# Patient Record
Sex: MALE | Race: WHITE | Employment: FULL TIME | URBAN - METROPOLITAN AREA
[De-identification: names, ages, dates, MRNs, and addresses within clinical notes are randomized per-mention and may not be internally consistent; named-entity substitution may affect disease eponyms.]

---

## 2017-01-19 ENCOUNTER — PRE VISIT (OUTPATIENT)
Dept: CARDIOLOGY | Facility: CLINIC | Age: 62
End: 2017-01-19

## 2017-02-03 ENCOUNTER — OFFICE VISIT (OUTPATIENT)
Dept: CARDIOLOGY | Facility: CLINIC | Age: 62
End: 2017-02-03
Attending: INTERNAL MEDICINE
Payer: COMMERCIAL

## 2017-02-03 VITALS
HEIGHT: 73 IN | BODY MASS INDEX: 28.36 KG/M2 | SYSTOLIC BLOOD PRESSURE: 118 MMHG | DIASTOLIC BLOOD PRESSURE: 72 MMHG | HEART RATE: 60 BPM | WEIGHT: 214 LBS

## 2017-02-03 DIAGNOSIS — I25.10 CORONARY ARTERY DISEASE INVOLVING NATIVE CORONARY ARTERY OF NATIVE HEART WITHOUT ANGINA PECTORIS: ICD-10-CM

## 2017-02-03 DIAGNOSIS — I10 ESSENTIAL HYPERTENSION, BENIGN: Primary | ICD-10-CM

## 2017-02-03 DIAGNOSIS — E78.00 HYPERCHOLESTEROLEMIA: ICD-10-CM

## 2017-02-03 DIAGNOSIS — I25.2 OLD MYOCARDIAL INFARCTION: ICD-10-CM

## 2017-02-03 LAB
ANION GAP SERPL CALCULATED.3IONS-SCNC: 10.5 MMOL/L (ref 6–17)
BUN SERPL-MCNC: 13 MG/DL (ref 7–30)
CALCIUM SERPL-MCNC: 9.1 MG/DL (ref 8.5–10.5)
CHLORIDE SERPL-SCNC: 104 MMOL/L (ref 98–107)
CHOLEST SERPL-MCNC: 108 MG/DL
CO2 SERPL-SCNC: 31 MMOL/L (ref 23–29)
CREAT SERPL-MCNC: 0.96 MG/DL (ref 0.7–1.3)
GFR SERPL CREATININE-BSD FRML MDRD: 80 ML/MIN/1.7M2
GLUCOSE SERPL-MCNC: 103 MG/DL (ref 70–105)
HDLC SERPL-MCNC: 36 MG/DL
LDLC SERPL CALC-MCNC: 59 MG/DL
NONHDLC SERPL-MCNC: 72 MG/DL
POTASSIUM SERPL-SCNC: 4.5 MMOL/L (ref 3.5–5.1)
SODIUM SERPL-SCNC: 141 MMOL/L (ref 136–145)
TRIGL SERPL-MCNC: 63 MG/DL

## 2017-02-03 PROCEDURE — 80048 BASIC METABOLIC PNL TOTAL CA: CPT | Performed by: INTERNAL MEDICINE

## 2017-02-03 PROCEDURE — 80061 LIPID PANEL: CPT | Performed by: INTERNAL MEDICINE

## 2017-02-03 PROCEDURE — 36415 COLL VENOUS BLD VENIPUNCTURE: CPT | Performed by: INTERNAL MEDICINE

## 2017-02-03 PROCEDURE — 99214 OFFICE O/P EST MOD 30 MIN: CPT | Performed by: INTERNAL MEDICINE

## 2017-02-03 NOTE — PROGRESS NOTES
HISTORY OF PRESENT ILLNESS:  Adis is a very nice 61-year-old gentleman who is brother-in-law to one of my best friends from medical school.  I also see Adis's sister in clinic as they both have a very strong family history of coronary artery disease and very high calcium scores.      Adis's cardiac history dates back to 2002 when he had an inferior wall myocardial infarction with bare metal stents being placed in all 3 coronary arteries at [facility].  In 02/2010, a second inferior wall myocardial infarction led to a drug-eluting stent placed in his distal right coronary artery.  The infarct was tiny.  Adis and his sister both have refractory hypercholesterolemia.  He struggled with cigarettes for quite some time, but has quit completely.  In the past, he has had problems with some erectile dysfunction and was having infertility problems.      Adis returns to clinic stating things are going quite well.  He has no exertional chest, arm, neck, jaw or shoulder discomfort.  No lightheadedness, dizziness, syncope or near-syncope.  He notes no side effects or problems with his current medical regimen.  He states he is not exercising as much as he should be, but has managed to lose about 9 pounds of weight over the last year, stating he is just watching calories and trying to eat healthier.  He states he remains off of cigarettes completely.      ASSESSMENT AND PLAN:  Adis appears to be doing quite well from a cardiac standpoint without clinical evidence of ischemia, heart failure or significant arrhythmia.      Blood pressure is very well controlled at 118/72, in the SPRINT range.      Fasting lipid profile is also outstanding.  Total cholesterol is 108.  HDL is 36, LDL is 59, triglycerides are 63.  In most aspects, it is the best cholesterol profile he has ever had.  HDL has dropped down some, but I have pointed out that LDL and total cholesterol and triglycerides are all significantly down.  Overall, if  we looked at a risk ratio of cholesterol to HDL he is still doing quite well.  He is on rosuvastatin 40 and Zetia 10 and we will continue both of these medications.      Reviewing the rest of his medications, he will stay on aspirin, losartan and Bystolic.  On his own, he takes fish oil capsules and multivitamin.      I have encouraged him to make sure he continues to exercise regularly, aiming for at least 5 times a week, also to keep adding more fresh fruits and vegetables, high fiber, whole grain foods, cut back on the red meat and the processed foods.  I have congratulated him on his 9-pound weight loss.  I will plan on seeing him back in 1 year.  If he should have any problems, I would be glad to see him sooner.      Thank you for allowing me to participate in his care.         SANDEEP PUENTES MD, [facility]             D: 2017 09:36   T: 2017 13:28   MT: ARABELLA      Name:     KEEGAN MYRICK   MRN:      [id number]        Account:      [id number]   :      1955           Service Date: 2017      Document: [id number]

## 2017-02-03 NOTE — PROGRESS NOTES
HPI and Plan:   See dictation    Orders Placed This Encounter   Procedures     Basic metabolic panel     Lipid Profile     Follow-Up with Cardiologist       No orders of the defined types were placed in this encounter.       There are no discontinued medications.      Encounter Diagnoses   Name Primary?     Coronary artery disease involving native coronary artery of native heart without angina pectoris      Essential hypertension, benign Yes     Hypercholesterolemia      Old myocardial infarction        CURRENT MEDICATIONS:  Current Outpatient Prescriptions   Medication Sig Dispense Refill     nitroglycerin (NITROSTAT) 0.4 MG SL tablet Place 1 tablet (0.4 mg) under the tongue every 5 minutes as needed for chest pain 25 tablet 3     losartan (COZAAR) 25 MG tablet Take 1 tablet (25 mg) by mouth daily 90 tablet 1     nebivolol (BYSTOLIC) 5 MG tablet Take 1 tablet (5 mg) by mouth daily 90 tablet 1     rosuvastatin (CRESTOR) 40 MG tablet Take 1 tablet (40 mg) by mouth daily 90 tablet 1     tadalafil (CIALIS) 10 MG tablet Take 1 tablet (10 mg) by mouth daily as needed for erectile dysfunction 14 tablet 6     ezetimibe (ZETIA) 10 MG tablet Take 1 tablet (10 mg) by mouth daily 30 tablet 11     aspirin 81 MG tablet Take by mouth daily       Multiple Vitamins-Minerals (MULTIVITAMIN OR) Take by mouth daily       Omega-3 Fatty Acids (FISH OIL) 500 MG CAPS Take 1,500 mg by mouth daily         ALLERGIES   No Known Allergies    PAST MEDICAL HISTORY:  Past Medical History   Diagnosis Date     CAD (coronary artery disease)      cardiac cath 2010: ALEJANDRA to RCA; cardiac cath 2002: BMS to RCA, mid LAD, mid circumflex     Old myocardial infarction      inferior MI 2010, inferior MI 2002     Hypercholesterolemia      Benign essential hypertension        PAST SURGICAL HISTORY:  Past Surgical History   Procedure Laterality Date     Heart cath coronary angiogram w/lv gram  1/2002     @ [facility] Bare Metal stents X 4,  distal RCA, mid :AD and  "mid Circumflex     Heart cath coronary angiogram w/lv gram  2/2010     2/10 Mid RCA thrombotic 90 % lesion stented       FAMILY HISTORY:  Family History   Problem Relation Age of Onset     HEART DISEASE Father      MI at 58       SOCIAL HISTORY:  Social History     Social History     Marital Status:      Spouse Name: N/A     Number of Children: N/A     Years of Education: N/A     Social History Main Topics     Smoking status: Former Smoker     Types: Cigars     Smokeless tobacco: None      Comment: smokes very occ. cigar     Alcohol Use: 0.0 oz/week     0 Standard drinks or equivalent per week      Comment: 2-3 times per week     Drug Use: No     Sexual Activity: Not Asked     Other Topics Concern     Caffeine Concern No     2-3 cups per day     Sleep Concern No     Stress Concern No     Special Diet Yes     low fat, high fiber     Back Care No     Exercise No     Parent/Sibling W/ Cabg, Mi Or Angioplasty Before 65f 55m? No     Social History Narrative       Review of Systems:  Skin:  Negative       Eyes:  Negative      ENT:  Negative      Respiratory:  Negative       Cardiovascular:  Negative      Gastroenterology: Positive for heartburn    Genitourinary:  Negative      Musculoskeletal:  Negative      Neurologic:  Negative      Psychiatric:  Negative      Heme/Lymph/Imm:  Negative      Endocrine:  Negative        Physical Exam:  Vitals: /72 mmHg  Pulse 60  Ht 1.854 m (6' 1\")  Wt 97.07 kg (214 lb)  BMI 28.24 kg/m2    Constitutional:  cooperative, alert and oriented, well developed, well nourished, in no acute distress overweight      Skin:  warm and dry to the touch, no apparent skin lesions or masses noted        Head:  normocephalic, no masses or lesions        Eyes:  pupils equal and round;conjunctivae and lids unremarkable;sclera white;no xanthalasma;no nystagmus        ENT:           Neck:  carotid pulses are full and equal bilaterally;no carotid bruit        Chest:  normal breath sounds, clear " to auscultation, normal A-P diameter, normal symmetry, normal respiratory excursion, no use of accessory muscles          Cardiac: regular rhythm;no murmurs, gallops or rubs detected;normal S1 and S2                  Abdomen:           Vascular: pulses full and equal                                        Extremities and Back:  no edema;no spinal abnormalities noted;normal muscle strength and tone              Neurological:  affect appropriate, oriented to time, person and place;no gross motor deficits              CC  Christopher Martin MD  [facility]  [address] [place] [id number]

## 2017-02-03 NOTE — Clinical Note
2/3/2017    Mary SEPULVEDA [facility]  [address] [place] [address]    RE: Adis Prince     Dear Colleague,    I had the pleasure of seeing Adis Prince in the [facility].    Adis is a very nice 61-year-old gentleman who is brother-in-law to one of my best friends from medical school.  I also see Adis's sister in clinic as they both have a very strong family history of coronary artery disease and very high calcium scores.      Adis's cardiac history dates back to 2002 when he had an inferior wall myocardial infarction with bare metal stents being placed in all 3 coronary arteries at [facility].  In 02/2010, a second inferior wall myocardial infarction led to a drug-eluting stent placed in his distal right coronary artery.  The infarct was tiny.  Adis and his sister both have refractory hypercholesterolemia.  He struggled with cigarettes for quite some time, but has quit completely.  In the past, he has had problems with some erectile dysfunction and was having infertility problems.      Adis returns to clinic stating things are going quite well.  He has no exertional chest, arm, neck, jaw or shoulder discomfort.  No lightheadedness, dizziness, syncope or near-syncope.  He notes no side effects or problems with his current medical regimen.  He states he is not exercising as much as he should be, but has managed to lose about 9 pounds of weight over the last year, stating he is just watching calories and trying to eat healthier.  He states he remains off of cigarettes completely.      Outpatient Encounter Prescriptions as of 2/3/2017   Medication Sig Dispense Refill     nitroglycerin (NITROSTAT) 0.4 MG SL tablet Place 1 tablet (0.4 mg) under the tongue every 5 minutes as needed for chest pain 25 tablet 3     losartan (COZAAR) 25 MG tablet Take 1 tablet (25 mg) by mouth daily 90 tablet 1     nebivolol (BYSTOLIC) 5 MG tablet Take 1 tablet (5  mg) by mouth daily 90 tablet 1     rosuvastatin (CRESTOR) 40 MG tablet Take 1 tablet (40 mg) by mouth daily 90 tablet 1     tadalafil (CIALIS) 10 MG tablet Take 1 tablet (10 mg) by mouth daily as needed for erectile dysfunction 14 tablet 6     ezetimibe (ZETIA) 10 MG tablet Take 1 tablet (10 mg) by mouth daily 30 tablet 11     aspirin 81 MG tablet Take by mouth daily       Multiple Vitamins-Minerals (MULTIVITAMIN OR) Take by mouth daily       Omega-3 Fatty Acids (FISH OIL) 500 MG CAPS Take 1,500 mg by mouth daily       No facility-administered encounter medications on file as of 2/3/2017.     ASSESSMENT AND PLAN:  Adis appears to be doing quite well from a cardiac standpoint without clinical evidence of ischemia, heart failure or significant arrhythmia.      Blood pressure is very well controlled at 118/72, in the SPRINT range.      Fasting lipid profile is also outstanding.  Total cholesterol is 108.  HDL is 36, LDL is 59, triglycerides are 63.  In most aspects, it is the best cholesterol profile he has ever had.  HDL has dropped down some, but I have pointed out that LDL and total cholesterol and triglycerides are all significantly down.  Overall, if we looked at a risk ratio of cholesterol to HDL he is still doing quite well.  He is on rosuvastatin 40 and Zetia 10 and we will continue both of these medications.      Reviewing the rest of his medications, he will stay on aspirin, losartan and Bystolic.  On his own, he takes fish oil capsules and multivitamin.      I have encouraged him to make sure he continues to exercise regularly, aiming for at least 5 times a week, also to keep adding more fresh fruits and vegetables, high fiber, whole grain foods, cut back on the red meat and the processed foods.  I have congratulated him on his 9-pound weight loss.  I will plan on seeing him back in 1 year.  If he should have any problems, I would be glad to see him sooner.      Thank you for allowing me to participate in  his care.      Sincerely,    Christopher Martin MD     [facility]

## 2017-02-16 DIAGNOSIS — I25.10 CORONARY ARTERY DISEASE INVOLVING NATIVE CORONARY ARTERY OF NATIVE HEART WITHOUT ANGINA PECTORIS: ICD-10-CM

## 2017-02-16 RX ORDER — EZETIMIBE 10 MG/1
10 TABLET ORAL DAILY
Qty: 90 TABLET | Refills: 3 | Status: SHIPPED | OUTPATIENT
Start: 2017-02-16 | End: 2018-03-20

## 2017-03-27 DIAGNOSIS — I10 ESSENTIAL HYPERTENSION, BENIGN: ICD-10-CM

## 2017-03-27 DIAGNOSIS — E78.00 HYPERCHOLESTEROLEMIA: ICD-10-CM

## 2017-03-27 RX ORDER — LOSARTAN POTASSIUM 25 MG/1
25 TABLET ORAL DAILY
Qty: 90 TABLET | Refills: 3 | Status: SHIPPED | OUTPATIENT
Start: 2017-03-27 | End: 2018-05-25

## 2017-03-27 RX ORDER — NEBIVOLOL 5 MG/1
5 TABLET ORAL DAILY
Qty: 90 TABLET | Refills: 3 | Status: SHIPPED | OUTPATIENT
Start: 2017-03-27 | End: 2018-05-25

## 2017-03-27 RX ORDER — ROSUVASTATIN CALCIUM 40 MG/1
40 TABLET, COATED ORAL DAILY
Qty: 90 TABLET | Refills: 3 | Status: SHIPPED | OUTPATIENT
Start: 2017-03-27 | End: 2018-05-25

## 2018-01-11 ENCOUNTER — PRE VISIT (OUTPATIENT)
Dept: CARDIOLOGY | Facility: CLINIC | Age: 63
End: 2018-01-11

## 2018-01-11 DIAGNOSIS — I25.10 CORONARY ARTERY DISEASE INVOLVING NATIVE CORONARY ARTERY OF NATIVE HEART WITHOUT ANGINA PECTORIS: Primary | ICD-10-CM

## 2018-01-17 NOTE — TELEPHONE ENCOUNTER
Received reply from Trever WAGNER, patient needs new ANAND.  Called patient to discuss, he states he has not seen PMD for over a year but will sign the next time he is there.

## 2018-02-06 ENCOUNTER — OFFICE VISIT (OUTPATIENT)
Dept: CARDIOLOGY | Facility: CLINIC | Age: 63
End: 2018-02-06
Attending: INTERNAL MEDICINE
Payer: COMMERCIAL

## 2018-02-06 VITALS
WEIGHT: 219.8 LBS | HEIGHT: 73 IN | BODY MASS INDEX: 29.13 KG/M2 | DIASTOLIC BLOOD PRESSURE: 86 MMHG | HEART RATE: 60 BPM | SYSTOLIC BLOOD PRESSURE: 126 MMHG

## 2018-02-06 DIAGNOSIS — I25.10 CORONARY ARTERY DISEASE INVOLVING NATIVE CORONARY ARTERY OF NATIVE HEART WITHOUT ANGINA PECTORIS: ICD-10-CM

## 2018-02-06 DIAGNOSIS — I10 ESSENTIAL HYPERTENSION, BENIGN: ICD-10-CM

## 2018-02-06 DIAGNOSIS — E66.3 OVERWEIGHT (BMI 25.0-29.9): Chronic | ICD-10-CM

## 2018-02-06 DIAGNOSIS — E78.00 HYPERCHOLESTEROLEMIA: Primary | ICD-10-CM

## 2018-02-06 LAB
ANION GAP SERPL CALCULATED.3IONS-SCNC: 11.4 MMOL/L (ref 6–17)
BUN SERPL-MCNC: 13 MG/DL (ref 7–30)
CALCIUM SERPL-MCNC: 9.1 MG/DL (ref 8.5–10.5)
CHLORIDE SERPL-SCNC: 104 MMOL/L (ref 98–107)
CHOLEST SERPL-MCNC: 121 MG/DL
CO2 SERPL-SCNC: 28 MMOL/L (ref 23–29)
CREAT SERPL-MCNC: 1.03 MG/DL (ref 0.7–1.3)
GFR SERPL CREATININE-BSD FRML MDRD: 74 ML/MIN/1.7M2
GLUCOSE SERPL-MCNC: 104 MG/DL (ref 70–105)
HDLC SERPL-MCNC: 45 MG/DL
LDLC SERPL CALC-MCNC: 60 MG/DL
NONHDLC SERPL-MCNC: 76 MG/DL
POTASSIUM SERPL-SCNC: 4.4 MMOL/L (ref 3.5–5.1)
SODIUM SERPL-SCNC: 139 MMOL/L (ref 136–145)
TRIGL SERPL-MCNC: 82 MG/DL

## 2018-02-06 PROCEDURE — 80048 BASIC METABOLIC PNL TOTAL CA: CPT | Performed by: INTERNAL MEDICINE

## 2018-02-06 PROCEDURE — 99213 OFFICE O/P EST LOW 20 MIN: CPT | Performed by: INTERNAL MEDICINE

## 2018-02-06 PROCEDURE — 36415 COLL VENOUS BLD VENIPUNCTURE: CPT | Performed by: INTERNAL MEDICINE

## 2018-02-06 PROCEDURE — 80061 LIPID PANEL: CPT | Performed by: INTERNAL MEDICINE

## 2018-02-06 NOTE — LETTER
2/6/2018    Mary SEPULVEDA [facility] [address] [place] [address]    RE: Adis Prince       Dear Colleague,    I had the pleasure of seeing Adis Prince in the [facility].    HPI and Plan:   See dictation    Orders Placed This Encounter   Procedures     Basic metabolic panel     Lipid Profile     ALT     Follow-Up with Cardiologist       No orders of the defined types were placed in this encounter.      There are no discontinued medications.      Encounter Diagnoses   Name Primary?     Coronary artery disease involving native coronary artery of native heart without angina pectoris      Hypercholesterolemia Yes     Essential hypertension, benign      Overweight (BMI 25.0-29.9)        CURRENT MEDICATIONS:  Current Outpatient Prescriptions   Medication Sig Dispense Refill     losartan (COZAAR) 25 MG tablet Take 1 tablet (25 mg) by mouth daily 90 tablet 3     rosuvastatin (CRESTOR) 40 MG tablet Take 1 tablet (40 mg) by mouth daily 90 tablet 3     nebivolol (BYSTOLIC) 5 MG tablet Take 1 tablet (5 mg) by mouth daily 90 tablet 3     ezetimibe (ZETIA) 10 MG tablet Take 1 tablet (10 mg) by mouth daily 90 tablet 3     nitroglycerin (NITROSTAT) 0.4 MG SL tablet Place 1 tablet (0.4 mg) under the tongue every 5 minutes as needed for chest pain 25 tablet 3     tadalafil (CIALIS) 10 MG tablet Take 1 tablet (10 mg) by mouth daily as needed for erectile dysfunction 14 tablet 6     aspirin 81 MG tablet Take by mouth daily       Multiple Vitamins-Minerals (MULTIVITAMIN OR) Take by mouth daily       Omega-3 Fatty Acids (FISH OIL) 500 MG CAPS Take 1,500 mg by mouth daily         ALLERGIES   No Known Allergies    PAST MEDICAL HISTORY:  Past Medical History:   Diagnosis Date     Benign essential hypertension      CAD (coronary artery disease)     cardiac cath 2010: ALEJANDRA to RCA; cardiac cath 2002: BMS to RCA, mid LAD, mid circumflex     Hypercholesterolemia      Old myocardial  "infarction     inferior MI 2010, inferior MI 2002       PAST SURGICAL HISTORY:  Past Surgical History:   Procedure Laterality Date     HEART CATH CORONARY ANGIOGRAM W/LV GRAM  1/2002    @ [facility] Bare Metal stents X 4,  distal RCA, mid :AD and mid Circumflex     HEART CATH CORONARY ANGIOGRAM W/LV GRAM  2/2010    2/10 Mid RCA thrombotic 90 % lesion stented       FAMILY HISTORY:  Family History   Problem Relation Age of Onset     HEART DISEASE Father      MI at 58       SOCIAL HISTORY:  Social History     Social History     Marital status:      Spouse name: N/A     Number of children: N/A     Years of education: N/A     Social History Main Topics     Smoking status: Former Smoker     Types: Cigars     Smokeless tobacco: Never Used      Comment: smokes very occ. cigar     Alcohol use 0.0 oz/week     0 Standard drinks or equivalent per week      Comment: 2-3 times per week     Drug use: No     Sexual activity: Not Asked     Other Topics Concern     Caffeine Concern No     2-3 cups per day     Sleep Concern No     Stress Concern No     Special Diet Yes     low fat, high fiber     Back Care No     Exercise No     Parent/Sibling W/ Cabg, Mi Or Angioplasty Before 65f 55m? No     Social History Narrative       Review of Systems:  Skin:  Negative       Eyes:  Negative      ENT:  Negative      Respiratory:  Negative       Cardiovascular:  Negative      Gastroenterology: Negative      Genitourinary:  Negative      Musculoskeletal:  Negative      Neurologic:  Negative      Psychiatric:  Negative      Heme/Lymph/Imm:  Negative      Endocrine:  Negative        Physical Exam:  Vitals: /86  Pulse 60  Ht 1.854 m (6' 1\")  Wt 99.7 kg (219 lb 12.8 oz)  BMI 29 kg/m2    Constitutional:  cooperative, alert and oriented, well developed, well nourished, in no acute distress overweight      Skin:  warm and dry to the touch, no apparent skin lesions or masses noted          Head:  normocephalic, no masses or lesions    "     Eyes:  pupils equal and round;conjunctivae and lids unremarkable;sclera white;no xanthalasma;no nystagmus        Lymph:      ENT:           Neck:  carotid pulses are full and equal bilaterally;no carotid bruit        Respiratory:  normal breath sounds, clear to auscultation, normal A-P diameter, normal symmetry, normal respiratory excursion, no use of accessory muscles         Cardiac: regular rhythm;no murmurs, gallops or rubs detected;normal S1 and S2                pulses full and equal                                        GI:           Extremities and Muscular Skeletal:  no edema;no spinal abnormalities noted;normal muscle strength and tone              Neurological:  no gross motor deficits        Psych:  affect appropriate, oriented to time, person and place        CC  Christopher Martin MD  [address]  [place] [address]                Thank you for allowing me to participate in the care of your patient.      Sincerely,     Christopher Martin MD     [facility]    cc:   Christopher Martin MD  [address] [place]  [place] [address]

## 2018-02-06 NOTE — PROGRESS NOTES
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  I have also seen Adis's sister in clinic, as they both have a very strong family history of coronary artery disease, very high calcium scores and refractory cholesterol profiles.      Adis's cardiac history dates back to 2002 when he had an inferior wall myocardial infarction with bare-metal stents being placed in all 3 coronary arteries at [facility].  In 02/2010, a second inferior wall myocardial infarction led to a drug-eluting stent placed in his distal right coronary artery.  The infarct was tiny.  Adis eventually quit smoking cigarettes, which was a major prakash.  He has also had problems with erectile dysfunction.      Adis returns to clinic stating he is doing quite well.  He has no exertional chest, arm, neck, jaw or shoulder discomfort.  No lightheadedness, dizziness, syncope or near-syncope.  He notes no side effects on his current medical regimen.  He states he is exercising and trying to eat healthier.      ASSESSMENT AND PLAN:  Adis appears to be doing well from a cardiac standpoint without clinical evidence of ischemia, heart failure or significant arrhythmia.      Blood pressure is very well controlled at 126/86 with a pulse of 60.      Weight is 219 pounds, giving a body mass index of 29.  We talked about the importance of exercise, diet, calorie restriction and trying to lose some weight.      I did point out that his fasting lipid profile is one of the best he has ever had, probably because he is exercising, even though he has not lost any weight.  His total cholesterol is 121, his HDL is 45, his LDL is 60, and triglycerides are 82.  We will continue his medications as is.      Basic metabolic profile:  Creatinine is 1.03 with a BUN of 13 and potassium of 4.4.      Overall, I think Adis is doing quite well.  He  could be better about his lifestyle interventions, and we emphasized this at today's visit.  I will see him back in 1 year.  If he should have any problems, I would be glad to see him sooner.         SANDEEP PUENTES MD, [facility]             D: 2018   T: 2018   MT: caity      Name:     KEEGAN MYRICK   MRN:      [id number]        Account:      [id number]   :      1955           Service Date: 2018      Document: [id number]

## 2018-02-06 NOTE — MR AVS SNAPSHOT
"              After Visit Summary   2/6/2018    Adis Prince    MRN: [id number]           Patient Information     Date Of Birth          1955        Visit Information        Provider Department      2/6/2018 7:50 AM [facility]        Today's Diagnoses     Coronary artery disease involving native coronary artery of native heart without angina pectoris           Follow-ups after your visit        Future tests that were ordered for you today     Open Future Orders        Priority Expected Expires Ordered    Basic metabolic panel Routine 2/6/2019 2/7/2019 2/6/2018    Lipid Profile Routine 2/6/2019 2/6/2019 2/6/2018    ALT Routine 2/6/2019 2/6/2019 2/6/2018    Follow-Up with Cardiologist Routine 2/6/2019 2/7/2019 2/6/2018            Who to contact     If you have questions or need follow up information about today's clinic visit or your schedule please contact [facility] directly at [phone].  Normal or non-critical lab and imaging results will be communicated to you by [employer], letter or phone within 4 business days after the clinic has received the results. If you do not hear from us within 7 days, please contact the clinic through Vaporet or phone. If you have a critical or abnormal lab result, we will notify you by phone as soon as possible.  Submit refill requests through Writer.ly or call your pharmacy and they will forward the refill request to us. Please allow 3 business days for your refill to be completed.          Additional Information About Your Visit        ACLEDA Bankhart Information     Writer.ly lets you send messages to your doctor, view your test results, renew your prescriptions, schedule appointments and more. To sign up, go to [email or web address] . Click on \"Log in\" on the left side of the screen, which will take you to the Welcome page. Then click on \"Sign up Now\" on the right side of the page.     You " will be asked to enter the access code listed below, as well as some personal information. Please follow the directions to create your username and password.     Your access code is: [email or web address]  Expires: 2018  9:22 AM     Your access code will  in 90 days. If you need help or a new code, please call your [place] clinic or [phone].        Care EveryWhere ID     This is your Care EveryWhere ID. This could be used by other organizations to access your [place] medical records  [id number]         Blood Pressure from Last 3 Encounters:   18 126/86   17 118/72   16 124/70    Weight from Last 3 Encounters:   18 99.7 kg (219 lb 12.8 oz)   17 97.1 kg (214 lb)   16 97.5 kg (215 lb)              We Performed the Following     Basic metabolic panel     Lipid Profile        Primary Care Provider Office Phone # Fax #    Mary SEPULVEDA Mendoza [phone]       [facility] [address] [place] [address]        Equal Access to Services     [facility]: Hadii aad ku hadasho Soomaali, waaxda luqadaha, qaybta kaalmada adeegyada, herb maguire . So [facility] [phone].    ATENCIÓN: Si habla español, tiene a hernandez disposición servicios gratuitos de asistencia lingüística. Llame al [phone].    We comply with applicable federal civil rights laws and [place] laws. We do not discriminate on the basis of race, color, national origin, age, disability, sex, sexual orientation, or gender identity.            Thank you!     Thank you for choosing [facility]  for your care. Our goal is always to provide you with excellent care. Hearing back from our patients is one way we can continue to improve our services. Please take a few minutes to complete the written survey that you may receive in the mail after your visit with us. Thank you!             Your Updated Medication List - Protect  others around you: Learn how to safely use, store and throw away your medicines at www.disposemymeds.org.          This list is accurate as of 2/6/18 10:47 AM.  Always use your most recent med list.                   Brand Name Dispense Instructions for use Diagnosis    aspirin 81 MG tablet      Take by mouth daily        ezetimibe 10 MG tablet    ZETIA    90 tablet    Take 1 tablet (10 mg) by mouth daily    Coronary artery disease involving native coronary artery of native heart without angina pectoris       Fish Oil 500 MG Caps      Take 1,500 mg by mouth daily        losartan 25 MG tablet    COZAAR    90 tablet    Take 1 tablet (25 mg) by mouth daily    Essential hypertension, benign       MULTIVITAMIN PO      Take by mouth daily        nebivolol 5 MG tablet    BYSTOLIC    90 tablet    Take 1 tablet (5 mg) by mouth daily    Essential hypertension, benign       nitroGLYcerin 0.4 MG sublingual tablet    NITROSTAT    25 tablet    Place 1 tablet (0.4 mg) under the tongue every 5 minutes as needed for chest pain    Coronary artery disease       rosuvastatin 40 MG tablet    CRESTOR    90 tablet    Take 1 tablet (40 mg) by mouth daily    Hypercholesterolemia       tadalafil 10 MG tablet    CIALIS    14 tablet    Take 1 tablet (10 mg) by mouth daily as needed for erectile dysfunction    Erectile dysfunction, unspecified erectile dysfunction type

## 2018-02-06 NOTE — MR AVS SNAPSHOT
After Visit Summary   2/6/2018    Adis Prince    MRN: [id number]           Patient Information     Date Of Birth          1955        Visit Information        Provider Department      2/6/2018 8:45 AM Christopher Martin MD [facility]        Today's Diagnoses     Hypercholesterolemia    -  1    Coronary artery disease involving native coronary artery of native heart without angina pectoris        Essential hypertension, benign        Overweight (BMI 25.0-29.9)           Follow-ups after your visit        Additional Services     Follow-Up with Cardiologist                 Future tests that were ordered for you today     Open Future Orders        Priority Expected Expires Ordered    Basic metabolic panel Routine 2/6/2019 2/7/2019 2/6/2018    Lipid Profile Routine 2/6/2019 2/6/2019 2/6/2018    ALT Routine 2/6/2019 2/6/2019 2/6/2018    Follow-Up with Cardiologist Routine 2/6/2019 2/7/2019 2/6/2018            Who to contact     If you have questions or need follow up information about today's clinic visit or your schedule please contact [facility] directly at [phone].  Normal or non-critical lab and imaging results will be communicated to you by MyChart, letter or phone within 4 business days after the clinic has received the results. If you do not hear from us within 7 days, please contact the clinic through [employer] or phone. If you have a critical or abnormal lab result, we will notify you by phone as soon as possible.  Submit refill requests through [employer] or call your pharmacy and they will forward the refill request to us. Please allow 3 business days for your refill to be completed.          Additional Information About Your Visit        MyChart Information     [employer] lets you send messages to your doctor, view your test results, renew your prescriptions, schedule appointments and more. To sign up, go to  [email or web address] . Click on \"Log in\" on the left side of the screen, which will take you to the Welcome page. Then click on \"Sign up Now\" on the right side of the page.     You will be asked to enter the access code listed below, as well as some personal information. Please follow the directions to create your username and password.     Your access code is: [email or web address]  Expires: 2018  9:22 AM     Your access code will  in 90 days. If you need help or a new code, please call your [place] clinic or [phone].        Care EveryWhere ID     This is your Care EveryWhere ID. This could be used by other organizations to access your [place] medical records  [id number]        Your Vitals Were     Pulse Height BMI (Body Mass Index)             60 1.854 m (6' 1\") 29 kg/m2          Blood Pressure from Last 3 Encounters:   18 126/86   17 118/72   16 124/70    Weight from Last 3 Encounters:   18 99.7 kg (219 lb 12.8 oz)   17 97.1 kg (214 lb)   16 97.5 kg (215 lb)              We Performed the Following     Follow-Up with Cardiologist        Primary Care Provider Office Phone # Fax #    Mary Donaldson [phone]       [facility] [address] [place] [address]        Equal Access to Services     [facility]: Hadii aad ku hadasho Soomaali, waaxda luqadaha, qaybta kaalmada adeegyada, herb idiin hayangelica maguire . So [facility] [phone].    ATENCIÓN: Si habla español, tiene a hernandez disposición servicios gratuitos de asistencia lingüística. Llame al [phone].    We comply with applicable federal civil rights laws and [place] laws. We do not discriminate on the basis of race, color, national origin, age, disability, sex, sexual orientation, or gender identity.            Thank you!     Thank you for choosing [facility]  for your care. Our goal is always to provide you with " excellent care. Hearing back from our patients is one way we can continue to improve our services. Please take a few minutes to complete the written survey that you may receive in the mail after your visit with us. Thank you!             Your Updated Medication List - Protect others around you: Learn how to safely use, store and throw away your medicines at www.disposemymeds.org.          This list is accurate as of 2/6/18  9:22 AM.  Always use your most recent med list.                   Brand Name Dispense Instructions for use Diagnosis    aspirin 81 MG tablet      Take by mouth daily        ezetimibe 10 MG tablet    ZETIA    90 tablet    Take 1 tablet (10 mg) by mouth daily    Coronary artery disease involving native coronary artery of native heart without angina pectoris       Fish Oil 500 MG Caps      Take 1,500 mg by mouth daily        losartan 25 MG tablet    COZAAR    90 tablet    Take 1 tablet (25 mg) by mouth daily    Essential hypertension, benign       MULTIVITAMIN PO      Take by mouth daily        nebivolol 5 MG tablet    BYSTOLIC    90 tablet    Take 1 tablet (5 mg) by mouth daily    Essential hypertension, benign       nitroGLYcerin 0.4 MG sublingual tablet    NITROSTAT    25 tablet    Place 1 tablet (0.4 mg) under the tongue every 5 minutes as needed for chest pain    Coronary artery disease       rosuvastatin 40 MG tablet    CRESTOR    90 tablet    Take 1 tablet (40 mg) by mouth daily    Hypercholesterolemia       tadalafil 10 MG tablet    CIALIS    14 tablet    Take 1 tablet (10 mg) by mouth daily as needed for erectile dysfunction    Erectile dysfunction, unspecified erectile dysfunction type

## 2018-02-06 NOTE — LETTER
2/6/2018      Mary SEPULVEDA [facility] [address] [place] [address]      RE: Adis AU Suresh       Dear Colleague,    I had the pleasure of seeing Adis Prince in the [facility].    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  I have also seen Adis's sister in clinic, as they both have a very strong family history of coronary artery disease, very high calcium scores and refractory cholesterol profiles.      Adis's cardiac history dates back to 2002 when he had an inferior wall myocardial infarction with bare-metal stents being placed in all 3 coronary arteries at [facility].  In 02/2010, a second inferior wall myocardial infarction led to a drug-eluting stent placed in his distal right coronary artery.  The infarct was tiny.  Adis eventually quit smoking cigarettes, which was a major prakash.  He has also had problems with erectile dysfunction.      Adis returns to clinic stating he is doing quite well.  He has no exertional chest, arm, neck, jaw or shoulder discomfort.  No lightheadedness, dizziness, syncope or near-syncope.  He notes no side effects on his current medical regimen.  He states he is exercising and trying to eat healthier.      ASSESSMENT AND PLAN:  Adis appears to be doing well from a cardiac standpoint without clinical evidence of ischemia, heart failure or significant arrhythmia.      Blood pressure is very well controlled at 126/86 with a pulse of 60.      Weight is 219 pounds, giving a body mass index of 29.  We talked about the importance of exercise, diet, calorie restriction and trying to lose some weight.      I did point out that his fasting lipid profile is one of the best he has ever had, probably because he is exercising, even though he has not lost any weight.  His total cholesterol is  121, his HDL is 45, his LDL is 60, and triglycerides are 82.  We will continue his medications as is.      Basic metabolic profile:  Creatinine is 1.03 with a BUN of 13 and potassium of 4.4.      Overall, I think Adis is doing quite well.  He could be better about his lifestyle interventions, and we emphasized this at today's visit.  I will see him back in 1 year.  If he should have any problems, I would be glad to see him sooner.         CHRISTOPHER PUENTES MD, [facility]             D: 2018   T: 2018   MT: caity      Name:     ADIS MYRICK   MRN:      [id number]        Account:      [id number]   :      1955           Service Date: 2018      Document: [id number]         Outpatient Encounter Prescriptions as of 2018   Medication Sig Dispense Refill     losartan (COZAAR) 25 MG tablet Take 1 tablet (25 mg) by mouth daily 90 tablet 3     rosuvastatin (CRESTOR) 40 MG tablet Take 1 tablet (40 mg) by mouth daily 90 tablet 3     nebivolol (BYSTOLIC) 5 MG tablet Take 1 tablet (5 mg) by mouth daily 90 tablet 3     ezetimibe (ZETIA) 10 MG tablet Take 1 tablet (10 mg) by mouth daily 90 tablet 3     nitroglycerin (NITROSTAT) 0.4 MG SL tablet Place 1 tablet (0.4 mg) under the tongue every 5 minutes as needed for chest pain 25 tablet 3     tadalafil (CIALIS) 10 MG tablet Take 1 tablet (10 mg) by mouth daily as needed for erectile dysfunction 14 tablet 6     aspirin 81 MG tablet Take by mouth daily       Multiple Vitamins-Minerals (MULTIVITAMIN OR) Take by mouth daily       Omega-3 Fatty Acids (FISH OIL) 500 MG CAPS Take 1,500 mg by mouth daily       No facility-administered encounter medications on file as of 2018.        Again, thank you for allowing me to participate in the care of your patient.      Sincerely,    Christopher Puentes MD     [facility]

## 2018-02-06 NOTE — PROGRESS NOTES
HPI and Plan:   See dictation    Orders Placed This Encounter   Procedures     Basic metabolic panel     Lipid Profile     ALT     Follow-Up with Cardiologist       No orders of the defined types were placed in this encounter.      There are no discontinued medications.      Encounter Diagnoses   Name Primary?     Coronary artery disease involving native coronary artery of native heart without angina pectoris      Hypercholesterolemia Yes     Essential hypertension, benign      Overweight (BMI 25.0-29.9)        CURRENT MEDICATIONS:  Current Outpatient Prescriptions   Medication Sig Dispense Refill     losartan (COZAAR) 25 MG tablet Take 1 tablet (25 mg) by mouth daily 90 tablet 3     rosuvastatin (CRESTOR) 40 MG tablet Take 1 tablet (40 mg) by mouth daily 90 tablet 3     nebivolol (BYSTOLIC) 5 MG tablet Take 1 tablet (5 mg) by mouth daily 90 tablet 3     ezetimibe (ZETIA) 10 MG tablet Take 1 tablet (10 mg) by mouth daily 90 tablet 3     nitroglycerin (NITROSTAT) 0.4 MG SL tablet Place 1 tablet (0.4 mg) under the tongue every 5 minutes as needed for chest pain 25 tablet 3     tadalafil (CIALIS) 10 MG tablet Take 1 tablet (10 mg) by mouth daily as needed for erectile dysfunction 14 tablet 6     aspirin 81 MG tablet Take by mouth daily       Multiple Vitamins-Minerals (MULTIVITAMIN OR) Take by mouth daily       Omega-3 Fatty Acids (FISH OIL) 500 MG CAPS Take 1,500 mg by mouth daily         ALLERGIES   No Known Allergies    PAST MEDICAL HISTORY:  Past Medical History:   Diagnosis Date     Benign essential hypertension      CAD (coronary artery disease)     cardiac cath 2010: ALEJANDRA to RCA; cardiac cath 2002: BMS to RCA, mid LAD, mid circumflex     Hypercholesterolemia      Old myocardial infarction     inferior MI 2010, inferior MI 2002       PAST SURGICAL HISTORY:  Past Surgical History:   Procedure Laterality Date     HEART CATH CORONARY ANGIOGRAM W/LV GRAM  1/2002    @ [facility] Bare Metal stents X 4,  distal RCA, mid :AD  "and mid Circumflex     HEART CATH CORONARY ANGIOGRAM W/LV GRAM  2/2010    2/10 Mid RCA thrombotic 90 % lesion stented       FAMILY HISTORY:  Family History   Problem Relation Age of Onset     HEART DISEASE Father      MI at 58       SOCIAL HISTORY:  Social History     Social History     Marital status:      Spouse name: N/A     Number of children: N/A     Years of education: N/A     Social History Main Topics     Smoking status: Former Smoker     Types: Cigars     Smokeless tobacco: Never Used      Comment: smokes very occ. cigar     Alcohol use 0.0 oz/week     0 Standard drinks or equivalent per week      Comment: 2-3 times per week     Drug use: No     Sexual activity: Not Asked     Other Topics Concern     Caffeine Concern No     2-3 cups per day     Sleep Concern No     Stress Concern No     Special Diet Yes     low fat, high fiber     Back Care No     Exercise No     Parent/Sibling W/ Cabg, Mi Or Angioplasty Before 65f 55m? No     Social History Narrative       Review of Systems:  Skin:  Negative       Eyes:  Negative      ENT:  Negative      Respiratory:  Negative       Cardiovascular:  Negative      Gastroenterology: Negative      Genitourinary:  Negative      Musculoskeletal:  Negative      Neurologic:  Negative      Psychiatric:  Negative      Heme/Lymph/Imm:  Negative      Endocrine:  Negative        Physical Exam:  Vitals: /86  Pulse 60  Ht 1.854 m (6' 1\")  Wt 99.7 kg (219 lb 12.8 oz)  BMI 29 kg/m2    Constitutional:  cooperative, alert and oriented, well developed, well nourished, in no acute distress overweight      Skin:  warm and dry to the touch, no apparent skin lesions or masses noted          Head:  normocephalic, no masses or lesions        Eyes:  pupils equal and round;conjunctivae and lids unremarkable;sclera white;no xanthalasma;no nystagmus        Lymph:      ENT:           Neck:  carotid pulses are full and equal bilaterally;no carotid bruit        Respiratory:  normal " breath sounds, clear to auscultation, normal A-P diameter, normal symmetry, normal respiratory excursion, no use of accessory muscles         Cardiac: regular rhythm;no murmurs, gallops or rubs detected;normal S1 and S2                pulses full and equal                                        GI:           Extremities and Muscular Skeletal:  no edema;no spinal abnormalities noted;normal muscle strength and tone              Neurological:  no gross motor deficits        Psych:  affect appropriate, oriented to time, person and place        CC  Christopher Martin MD  [address]  [place] [address]

## 2018-02-27 DIAGNOSIS — I25.10 CORONARY ARTERY DISEASE INVOLVING NATIVE CORONARY ARTERY OF NATIVE HEART WITHOUT ANGINA PECTORIS: ICD-10-CM

## 2018-02-27 DIAGNOSIS — I25.10 CORONARY ARTERY DISEASE: ICD-10-CM

## 2018-02-27 RX ORDER — NITROGLYCERIN 0.4 MG/1
0.4 TABLET SUBLINGUAL EVERY 5 MIN PRN
Qty: 25 TABLET | Refills: 3 | Status: SHIPPED | OUTPATIENT
Start: 2018-02-27 | End: 2020-01-14

## 2018-03-20 RX ORDER — EZETIMIBE 10 MG/1
10 TABLET ORAL DAILY
Qty: 90 TABLET | Refills: 3 | Status: SHIPPED | OUTPATIENT
Start: 2018-03-20 | End: 2019-03-22

## 2018-05-25 DIAGNOSIS — E78.00 HYPERCHOLESTEROLEMIA: ICD-10-CM

## 2018-05-25 DIAGNOSIS — I10 ESSENTIAL HYPERTENSION, BENIGN: ICD-10-CM

## 2018-05-25 RX ORDER — LOSARTAN POTASSIUM 25 MG/1
25 TABLET ORAL DAILY
Qty: 90 TABLET | Refills: 3 | Status: SHIPPED | OUTPATIENT
Start: 2018-05-25 | End: 2019-06-20

## 2018-05-25 RX ORDER — ROSUVASTATIN CALCIUM 40 MG/1
40 TABLET, COATED ORAL DAILY
Qty: 90 TABLET | Refills: 3 | Status: SHIPPED | OUTPATIENT
Start: 2018-05-25 | End: 2019-06-20

## 2018-05-25 RX ORDER — NEBIVOLOL 5 MG/1
5 TABLET ORAL DAILY
Qty: 90 TABLET | Refills: 3 | Status: SHIPPED | OUTPATIENT
Start: 2018-05-25 | End: 2019-06-20

## 2019-01-08 ENCOUNTER — TRANSFERRED RECORDS (OUTPATIENT)
Dept: HEALTH INFORMATION MANAGEMENT | Facility: CLINIC | Age: 64
End: 2019-01-08

## 2019-01-08 LAB
ANION GAP SERPL CALCULATED.3IONS-SCNC: NORMAL MMOL/L
BUN SERPL-MCNC: 13 MG/DL (ref 7–18)
CALCIUM SERPL-MCNC: 9.3 MG/DL (ref 8.5–10.1)
CHLORIDE SERPLBLD-SCNC: 103 MMOL/L (ref 98–107)
CO2 SERPL-SCNC: 29 MMOL/L (ref 21–32)
CREAT SERPL-MCNC: 0.78 MG/DL (ref 0.67–1.17)
ERYTHROCYTE [DISTWIDTH] IN BLOOD BY AUTOMATED COUNT: ABNORMAL %
GFR SERPL CREATININE-BSD FRML MDRD: <60 ML/MIN/1.73M2 (ref 60–150)
GLUCOSE SERPL-MCNC: 98 MG/DL (ref 74–100)
HEMOGLOBIN: 15.8 G/DL (ref 13–18)
Lab: ABNORMAL
MCH RBC QN AUTO: ABNORMAL PG
MCHC RBC AUTO-ENTMCNC: ABNORMAL G/DL
MCV RBC AUTO: ABNORMAL FL
PLATELET COUNT - QUEST: 193 10^9/L (ref 150–450)
POTASSIUM SERPL-SCNC: 4.3 MMOL/L (ref 3.5–5.1)
RBC # BLD AUTO: 4.88 10*6/UL (ref 4.4–6)
SODIUM SERPL-SCNC: 139 MMOL/L (ref 136–145)
WBC # BLD AUTO: 4.8 10^9/L (ref 4–11)

## 2019-02-12 ENCOUNTER — DOCUMENTATION ONLY (OUTPATIENT)
Dept: CARDIOLOGY | Facility: CLINIC | Age: 64
End: 2019-02-12

## 2019-02-12 NOTE — LETTER
February 12, 2019       TO: Adis Prince  [address] [place] [address]  [place] [address]       Dear Adis Prince,    We are reviewing our outstanding orders.    Dr. Martin has ordered an office visit and lab work for your annual follow up. Your last visit was in February, 2018. Dr. Martin has some openings in May.     Please contact the scheduling desk at [phone] to arrange for an appointment.     If you have any questions, please call the Team 2 nurse phone @ [phone]. If you had your lab work done at another facility outside of the [facility] system, please give us a call so we can locate the results.     Thank you  Team 2 nurses

## 2019-03-22 DIAGNOSIS — I25.10 CORONARY ARTERY DISEASE INVOLVING NATIVE CORONARY ARTERY OF NATIVE HEART WITHOUT ANGINA PECTORIS: ICD-10-CM

## 2019-03-22 RX ORDER — EZETIMIBE 10 MG/1
10 TABLET ORAL DAILY
Qty: 90 TABLET | Refills: 0 | Status: SHIPPED | OUTPATIENT
Start: 2019-03-22 | End: 2019-07-26

## 2019-04-02 ENCOUNTER — TRANSFERRED RECORDS (OUTPATIENT)
Dept: HEALTH INFORMATION MANAGEMENT | Facility: CLINIC | Age: 64
End: 2019-04-02

## 2019-04-23 ENCOUNTER — PRE VISIT (OUTPATIENT)
Dept: CARDIOLOGY | Facility: CLINIC | Age: 64
End: 2019-04-23

## 2019-04-23 NOTE — TELEPHONE ENCOUNTER
Spoke with Patient who states in FEb 2019 he had labs done at [facility]. Record request faxed.

## 2019-04-24 NOTE — TELEPHONE ENCOUNTER
PMD records received from [facility]. Last seen 4-2-19 to establish care.   OV 1-8-19 for h ealth maintance evaluation, - CBC, BMP, PSA, and EKG done.   Records sent to scan.

## 2019-05-16 ENCOUNTER — OFFICE VISIT (OUTPATIENT)
Dept: CARDIOLOGY | Facility: CLINIC | Age: 64
End: 2019-05-16
Attending: INTERNAL MEDICINE
Payer: COMMERCIAL

## 2019-05-16 VITALS
HEART RATE: 64 BPM | BODY MASS INDEX: 29.16 KG/M2 | HEIGHT: 73 IN | SYSTOLIC BLOOD PRESSURE: 118 MMHG | DIASTOLIC BLOOD PRESSURE: 78 MMHG | WEIGHT: 220 LBS

## 2019-05-16 DIAGNOSIS — I10 ESSENTIAL HYPERTENSION, BENIGN: ICD-10-CM

## 2019-05-16 DIAGNOSIS — E78.00 HYPERCHOLESTEROLEMIA: Primary | ICD-10-CM

## 2019-05-16 DIAGNOSIS — I25.10 CORONARY ARTERY DISEASE INVOLVING NATIVE CORONARY ARTERY OF NATIVE HEART WITHOUT ANGINA PECTORIS: ICD-10-CM

## 2019-05-16 DIAGNOSIS — N52.9 ERECTILE DYSFUNCTION, UNSPECIFIED ERECTILE DYSFUNCTION TYPE: ICD-10-CM

## 2019-05-16 LAB
ALT SERPL W P-5'-P-CCNC: 16 U/L (ref 5–30)
CHOLEST SERPL-MCNC: 109 MG/DL
HDLC SERPL-MCNC: 40 MG/DL
LDLC SERPL CALC-MCNC: 55 MG/DL
NONHDLC SERPL-MCNC: 69 MG/DL
TRIGL SERPL-MCNC: 71 MG/DL

## 2019-05-16 PROCEDURE — 84460 ALANINE AMINO (ALT) (SGPT): CPT | Performed by: INTERNAL MEDICINE

## 2019-05-16 PROCEDURE — 36415 COLL VENOUS BLD VENIPUNCTURE: CPT | Performed by: INTERNAL MEDICINE

## 2019-05-16 PROCEDURE — 80061 LIPID PANEL: CPT | Performed by: INTERNAL MEDICINE

## 2019-05-16 PROCEDURE — 99213 OFFICE O/P EST LOW 20 MIN: CPT | Performed by: INTERNAL MEDICINE

## 2019-05-16 RX ORDER — TADALAFIL 20 MG/1
20 TABLET ORAL PRN
Qty: 14 TABLET | Refills: 3 | Status: SHIPPED | OUTPATIENT
Start: 2019-05-16 | End: 2019-05-16

## 2019-05-16 RX ORDER — TADALAFIL 20 MG/1
20 TABLET ORAL PRN
Qty: 14 TABLET | Refills: 3 | Status: SHIPPED | OUTPATIENT
Start: 2019-05-16

## 2019-05-16 ASSESSMENT — MIFFLIN-ST. JEOR: SCORE: 1856.79

## 2019-05-16 NOTE — PROGRESS NOTES
HPI and Plan:   See dictation    Orders Placed This Encounter   Procedures     Basic metabolic panel     Lipid Profile     Follow-Up with Cardiologist       Orders Placed This Encounter   Medications     DISCONTD: tadalafil (CIALIS) 20 MG tablet     Sig: Take 1 tablet (20 mg) by mouth as needed (as desired)     Dispense:  14 tablet     Refill:  3     tadalafil (CIALIS) 20 MG tablet     Sig: Take 1 tablet (20 mg) by mouth as needed (as desired)     Dispense:  14 tablet     Refill:  3       Medications Discontinued During This Encounter   Medication Reason     tadalafil (CIALIS) 10 MG tablet      tadalafil (CIALIS) 20 MG tablet Reorder         Encounter Diagnoses   Name Primary?     Coronary artery disease involving native coronary artery of native heart without angina pectoris      Essential hypertension, benign      Erectile dysfunction, unspecified erectile dysfunction type      Hypercholesterolemia Yes       CURRENT MEDICATIONS:  Current Outpatient Medications   Medication Sig Dispense Refill     aspirin 81 MG tablet Take by mouth daily       ezetimibe (ZETIA) 10 MG tablet Take 1 tablet (10 mg) by mouth daily 90 tablet 0     losartan (COZAAR) 25 MG tablet Take 1 tablet (25 mg) by mouth daily 90 tablet 3     Multiple Vitamins-Minerals (MULTIVITAMIN OR) Take by mouth daily       nebivolol (BYSTOLIC) 5 MG tablet Take 1 tablet (5 mg) by mouth daily 90 tablet 3     nitroGLYcerin (NITROSTAT) 0.4 MG sublingual tablet Place 1 tablet (0.4 mg) under the tongue every 5 minutes as needed for chest pain 25 tablet 3     Omega-3 Fatty Acids (FISH OIL) 500 MG CAPS Take 1,500 mg by mouth daily       rosuvastatin (CRESTOR) 40 MG tablet Take 1 tablet (40 mg) by mouth daily 90 tablet 3     tadalafil (CIALIS) 20 MG tablet Take 1 tablet (20 mg) by mouth as needed (as desired) 14 tablet 3       ALLERGIES   No Known Allergies    PAST MEDICAL HISTORY:  Past Medical History:   Diagnosis Date     Benign essential hypertension      CAD  (coronary artery disease)     cardiac cath 2010: ALEJANDRA to RCA; cardiac cath 2002: BMS to RCA, mid LAD, mid circumflex     Hypercholesterolemia      Old myocardial infarction     inferior MI 2010, inferior MI 2002       PAST SURGICAL HISTORY:  Past Surgical History:   Procedure Laterality Date     HEART CATH CORONARY ANGIOGRAM W/LV GRAM  1/2002    @ [facility] B[employer] Metal stents X 4,  distal RCA, mid :AD and mid Circumflex     HEART CATH CORONARY ANGIOGRAM W/LV GRAM  2/2010    2/10 Mid RCA thrombotic 90 % lesion stented       FAMILY HISTORY:  Family History   Problem Relation Age of Onset     Heart Disease Father         MI at 58       SOCIAL HISTORY:  Social History     Socioeconomic History     Marital status:      Spouse name: None     Number of children: None     Years of education: None     Highest education level: None   Occupational History     None   Social Needs     Financial resource strain: None     Food insecurity:     Worry: None     Inability: None     Transportation needs:     Medical: None     Non-medical: None   Tobacco Use     Smoking status: Current Some Day Smoker     Types: Cigars     Smokeless tobacco: Never Used     Tobacco comment: 3-4 per year   Substance and Sexual Activity     Alcohol use: Yes     Alcohol/week: 0.0 oz     Comment: 1-2 times per week     Drug use: No     Sexual activity: None   Lifestyle     Physical activity:     Days per week: None     Minutes per session: None     Stress: None   Relationships     Social connections:     Talks on phone: None     Gets together: None     Attends Islam service: None     Active member of club or organization: None     Attends meetings of clubs or organizations: None     Relationship status: None     Intimate partner violence:     Fear of current or ex partner: None     Emotionally abused: None     Physically abused: None     Forced sexual activity: None   Other Topics Concern      Service Not Asked     Blood Transfusions Not Asked  "    Caffeine Concern No     Comment: 2-3 cups per day     Occupational Exposure Not Asked     Hobby Hazards Not Asked     Sleep Concern No     Stress Concern No     Weight Concern Not Asked     Special Diet Yes     Comment: low fat, high fiber     Back Care No     Exercise No     Bike Helmet Not Asked     Seat Belt Not Asked     Self-Exams Not Asked     Parent/sibling w/ CABG, MI or angioplasty before 65F 55M? No   Social History Narrative     None       Review of Systems:  Skin:  Negative       Eyes:  Negative      ENT:  Negative   (Cold sx)  Respiratory:  Negative       Cardiovascular:  Negative      Gastroenterology: Negative heartburn    Genitourinary:  Negative      Musculoskeletal:  Negative      Neurologic:  Negative      Psychiatric:  Negative      Heme/Lymph/Imm:  Negative      Endocrine:  Negative        Physical Exam:  Vitals: /78   Pulse 64   Ht 1.854 m (6' 1\")   Wt 99.8 kg (220 lb)   BMI 29.03 kg/m      Constitutional:  cooperative, alert and oriented, well developed, well nourished, in no acute distress overweight      Skin:  warm and dry to the touch, no apparent skin lesions or masses noted          Head:  normocephalic, no masses or lesions        Eyes:  pupils equal and round;conjunctivae and lids unremarkable;sclera white;no xanthalasma;no nystagmus        Lymph:      ENT:  no pallor or cyanosis, dentition good        Neck:  carotid pulses are full and equal bilaterally;no carotid bruit        Respiratory:  normal breath sounds, clear to auscultation, normal A-P diameter, normal symmetry, normal respiratory excursion, no use of accessory muscles         Cardiac: regular rhythm;no murmurs, gallops or rubs detected;normal S1 and S2                pulses full and equal                                        GI:           Extremities and Muscular Skeletal:  no edema;no spinal abnormalities noted;normal muscle strength and tone              Neurological:  no gross motor deficits        Psych: "  affect appropriate, oriented to time, person and place        CC  Christopher Martin MD  [address]  [place] [id number]

## 2019-05-16 NOTE — LETTER
5/16/2019      Mary SEPULVEDA [facility] [address] [place] [address]      RE: Adis Prince       Dear Colleague,    I had the pleasure of seeing Adis Prince in the [facility].    Service Date: 05/16/2019      HISTORY OF PRESENT ILLNESS:  Adis is a very nice, 63-year-old gentleman who is a brother-in-law to one of my best friends from medical school.  I also see Adis's sister in clinic, as they have a very strong family history of coronary artery disease, very high calcium scores with refractory cholesterol profiles.      Adis's cardiac history dates back to 2002 when he had an inferior wall myocardial infarction with bare-metal stents placed in all 3 coronary arteries at [facility].  In 2010, a second inferior wall myocardial infarction led to a drug-eluting stent placed in his distal right coronary artery.  The infarct was tiny.  Adis eventually quit smoking cigarettes, which was a major prakash.  He also has problems with erectile dysfunction.      Adis returns to clinic stating that he is doing quite well.  He states he is exercising probably 4 times a week.  He has no exertional chest, arm, neck, jaw or shoulder discomfort.  No dyspnea on exertion, orthopnea or PND.  No palpitations, lightheadedness, dizziness, syncope or near syncope.  He notes no side effects or problems with his current medical regimen.  He states he is feeling better than he has felt in years.      ASSESSMENT AND PLAN:  Adis appears to be doing quite well from a cardiac standpoint without clinical evidence of ischemia, heart failure or significant arrhythmia.      Blood pressure is very well controlled at 118/78 with a pulse of 64.      His weight is unchanged at 220 pounds, giving a body mass index of 29 with clothes on.      His increased exercise regimen, however, is showing up in his fasting lipid profile.  His total cholesterol is now down to 109, HDL is up  to 40, and LDL is 55 with triglycerides of 71.  Again, I have extolled the virtues of his regular exercise and healthy diet.  Even if he has not lost weight, it is reflecting in his best fasting lipid profile ever.      Electrolytes are within normal limits with a creatinine of 0.78, BUN of 13 and potassium of 4.3.      His major problem is still his erectile dysfunction.  He states he has been using some sildenafil from his primary care doctor, but he does not know the doses.  He gets a partial effect from a single dose.  If he takes a double dose, he starts having problems with lightheadedness and dizziness.  He would like to try Cialis again.  It has gone generic, so hopefully the price has come down.  He states it was $50 a pill, and that was just too expensive.  I sent in a prescription for 20 mg to [facility], but also gave him a paper one if he wants to explore the possibility of getting them from [facility].      I will plan on seeing him back in 1 year.  If he should have any problems, I would be glad to see him sooner.      Thank you for allowing me to participate in his care.         SANDEEP PUENTES MD, [facility]             D: 2019   T: 2019   MT: caity      Name:     KEEGAN MYRICK   MRN:      [id number]        Account:      [id number]   :      1955           Service Date: 2019      Document: [id number]         Outpatient Encounter Medications as of 2019   Medication Sig Dispense Refill     aspirin 81 MG tablet Take by mouth daily       ezetimibe (ZETIA) 10 MG tablet Take 1 tablet (10 mg) by mouth daily 90 tablet 0     losartan (COZAAR) 25 MG tablet Take 1 tablet (25 mg) by mouth daily 90 tablet 3     Multiple Vitamins-Minerals (MULTIVITAMIN OR) Take by mouth daily       nebivolol (BYSTOLIC) 5 MG tablet Take 1 tablet (5 mg) by mouth daily 90 tablet 3     nitroGLYcerin (NITROSTAT) 0.4 MG sublingual tablet Place 1 tablet (0.4 mg) under the tongue every 5 minutes as needed  for chest pain 25 tablet 3     Omega-3 Fatty Acids (FISH OIL) 500 MG CAPS Take 1,500 mg by mouth daily       rosuvastatin (CRESTOR) 40 MG tablet Take 1 tablet (40 mg) by mouth daily 90 tablet 3     tadalafil (CIALIS) 20 MG tablet Take 1 tablet (20 mg) by mouth as needed (as desired) 14 tablet 3     [DISCONTINUED] tadalafil (CIALIS) 10 MG tablet Take 1 tablet (10 mg) by mouth daily as needed for erectile dysfunction 14 tablet 6     [DISCONTINUED] tadalafil (CIALIS) 20 MG tablet Take 1 tablet (20 mg) by mouth as needed (as desired) 14 tablet 3     No facility-administered encounter medications on file as of 5/16/2019.        Again, thank you for allowing me to participate in the care of your patient.      Sincerely,    Christopher Martin MD     [facility]

## 2019-05-16 NOTE — PROGRESS NOTES
Service Date: 05/16/2019      HISTORY OF PRESENT ILLNESS:  Adis is a very nice, 63-year-old gentleman who is a brother-in-law to one of my best friends from medical school.  I also see Adis's sister in clinic, as they have a very strong family history of coronary artery disease, very high calcium scores with refractory cholesterol profiles.      Adis's cardiac history dates back to 2002 when he had an inferior wall myocardial infarction with bare-metal stents placed in all 3 coronary arteries at [facility].  In 2010, a second inferior wall myocardial infarction led to a drug-eluting stent placed in his distal right coronary artery.  The infarct was tiny.  Adis eventually quit smoking cigarettes, which was a major prakash.  He also has problems with erectile dysfunction.      Adis returns to clinic stating that he is doing quite well.  He states he is exercising probably 4 times a week.  He has no exertional chest, arm, neck, jaw or shoulder discomfort.  No dyspnea on exertion, orthopnea or PND.  No palpitations, lightheadedness, dizziness, syncope or near syncope.  He notes no side effects or problems with his current medical regimen.  He states he is feeling better than he has felt in years.      ASSESSMENT AND PLAN:  Adis appears to be doing quite well from a cardiac standpoint without clinical evidence of ischemia, heart failure or significant arrhythmia.      Blood pressure is very well controlled at 118/78 with a pulse of 64.      His weight is unchanged at 220 pounds, giving a body mass index of 29 with clothes on.      His increased exercise regimen, however, is showing up in his fasting lipid profile.  His total cholesterol is now down to 109, HDL is up to 40, and LDL is 55 with triglycerides of 71.  Again, I have extolled the virtues of his regular exercise and healthy diet.  Even if he has not lost weight, it is reflecting in his best fasting lipid profile ever.      Electrolytes are within  normal limits with a creatinine of 0.78, BUN of 13 and potassium of 4.3.      His major problem is still his erectile dysfunction.  He states he has been using some sildenafil from his primary care doctor, but he does not know the doses.  He gets a partial effect from a single dose.  If he takes a double dose, he starts having problems with lightheadedness and dizziness.  He would like to try Cialis again.  It has gone generic, so hopefully the price has come down.  He states it was $50 a pill, and that was just too expensive.  I sent in a prescription for 20 mg to [facility], but also gave him a paper one if he wants to explore the possibility of getting them from [facility].      I will plan on seeing him back in 1 year.  If he should have any problems, I would be glad to see him sooner.      Thank you for allowing me to participate in his care.         SANDEEP PUENTES MD, [facility]             D: 2019   T: 2019   MT: caity      Name:     KEEGAN MYRICK   MRN:      [id number]        Account:      [id number]   :      1955           Service Date: 2019      Document: [id number]

## 2019-05-16 NOTE — LETTER
5/16/2019    Mary Donaldson  [facility] [address] [place] [address]    RE: Adis Prince       Dear Colleague,    I had the pleasure of seeing Adis Prince in the [facility].    HPI and Plan:   See dictation    Orders Placed This Encounter   Procedures     Basic metabolic panel     Lipid Profile     Follow-Up with Cardiologist       Orders Placed This Encounter   Medications     DISCONTD: tadalafil (CIALIS) 20 MG tablet     Sig: Take 1 tablet (20 mg) by mouth as needed (as desired)     Dispense:  14 tablet     Refill:  3     tadalafil (CIALIS) 20 MG tablet     Sig: Take 1 tablet (20 mg) by mouth as needed (as desired)     Dispense:  14 tablet     Refill:  3       Medications Discontinued During This Encounter   Medication Reason     tadalafil (CIALIS) 10 MG tablet      tadalafil (CIALIS) 20 MG tablet Reorder         Encounter Diagnoses   Name Primary?     Coronary artery disease involving native coronary artery of native heart without angina pectoris      Essential hypertension, benign      Erectile dysfunction, unspecified erectile dysfunction type      Hypercholesterolemia Yes       CURRENT MEDICATIONS:  Current Outpatient Medications   Medication Sig Dispense Refill     aspirin 81 MG tablet Take by mouth daily       ezetimibe (ZETIA) 10 MG tablet Take 1 tablet (10 mg) by mouth daily 90 tablet 0     losartan (COZAAR) 25 MG tablet Take 1 tablet (25 mg) by mouth daily 90 tablet 3     Multiple Vitamins-Minerals (MULTIVITAMIN OR) Take by mouth daily       nebivolol (BYSTOLIC) 5 MG tablet Take 1 tablet (5 mg) by mouth daily 90 tablet 3     nitroGLYcerin (NITROSTAT) 0.4 MG sublingual tablet Place 1 tablet (0.4 mg) under the tongue every 5 minutes as needed for chest pain 25 tablet 3     Omega-3 Fatty Acids (FISH OIL) 500 MG CAPS Take 1,500 mg by mouth daily       rosuvastatin (CRESTOR) 40 MG tablet Take 1 tablet (40 mg) by mouth daily 90 tablet 3      tadalafil (CIALIS) 20 MG tablet Take 1 tablet (20 mg) by mouth as needed (as desired) 14 tablet 3       ALLERGIES   No Known Allergies    PAST MEDICAL HISTORY:  Past Medical History:   Diagnosis Date     Benign essential hypertension      CAD (coronary artery disease)     cardiac cath 2010: ALEJANDRA to RCA; cardiac cath 2002: BMS to RCA, mid LAD, mid circumflex     Hypercholesterolemia      Old myocardial infarction     inferior MI 2010, inferior MI 2002       PAST SURGICAL HISTORY:  Past Surgical History:   Procedure Laterality Date     HEART CATH CORONARY ANGIOGRAM W/LV GRAM  1/2002    @ [employer] Bare Metal stents X 4,  distal RCA, mid :AD and mid Circumflex     HEART CATH CORONARY ANGIOGRAM W/LV GRAM  2/2010    2/10 Mid RCA thrombotic 90 % lesion stented       FAMILY HISTORY:  Family History   Problem Relation Age of Onset     Heart Disease Father         MI at 58       SOCIAL HISTORY:  Social History     Socioeconomic History     Marital status:      Spouse name: None     Number of children: None     Years of education: None     Highest education level: None   Occupational History     None   Social Needs     Financial resource strain: None     Food insecurity:     Worry: None     Inability: None     Transportation needs:     Medical: None     Non-medical: None   Tobacco Use     Smoking status: Current Some Day Smoker     Types: Cigars     Smokeless tobacco: Never Used     Tobacco comment: 3-4 per year   Substance and Sexual Activity     Alcohol use: Yes     Alcohol/week: 0.0 oz     Comment: 1-2 times per week     Drug use: No     Sexual activity: None   Lifestyle     Physical activity:     Days per week: None     Minutes per session: None     Stress: None   Relationships     Social connections:     Talks on phone: None     Gets together: None     Attends Mu-ism service: None     Active member of club or organization: None     Attends meetings of clubs or organizations: None     Relationship status: None      "Intimate partner violence:     Fear of current or ex partner: None     Emotionally abused: None     Physically abused: None     Forced sexual activity: None   Other Topics Concern      Service Not Asked     Blood Transfusions Not Asked     Caffeine Concern No     Comment: 2-3 cups per day     Occupational Exposure Not Asked     Hobby Hazards Not Asked     Sleep Concern No     Stress Concern No     Weight Concern Not Asked     Special Diet Yes     Comment: low fat, high fiber     Back Care No     Exercise No     Bike Helmet Not Asked     Seat Belt Not Asked     Self-Exams Not Asked     Parent/sibling w/ CABG, MI or angioplasty before 65F 55M? No   Social History Narrative     None       Review of Systems:  Skin:  Negative       Eyes:  Negative      ENT:  Negative   (Cold sx)  Respiratory:  Negative       Cardiovascular:  Negative      Gastroenterology: Negative heartburn    Genitourinary:  Negative      Musculoskeletal:  Negative      Neurologic:  Negative      Psychiatric:  Negative      Heme/Lymph/Imm:  Negative      Endocrine:  Negative        Physical Exam:  Vitals: /78   Pulse 64   Ht 1.854 m (6' 1\")   Wt 99.8 kg (220 lb)   BMI 29.03 kg/m       Constitutional:  cooperative, alert and oriented, well developed, well nourished, in no acute distress overweight      Skin:  warm and dry to the touch, no apparent skin lesions or masses noted          Head:  normocephalic, no masses or lesions        Eyes:  pupils equal and round;conjunctivae and lids unremarkable;sclera white;no xanthalasma;no nystagmus        Lymph:      ENT:  no pallor or cyanosis, dentition good        Neck:  carotid pulses are full and equal bilaterally;no carotid bruit        Respiratory:  normal breath sounds, clear to auscultation, normal A-P diameter, normal symmetry, normal respiratory excursion, no use of accessory muscles         Cardiac: regular rhythm;no murmurs, gallops or rubs detected;normal S1 and S2              "   pulses full and equal                                        GI:           Extremities and Muscular Skeletal:  no edema;no spinal abnormalities noted;normal muscle strength and tone              Neurological:  no gross motor deficits        Psych:  affect appropriate, oriented to time, person and place        CC  Christopher Martin MD  [address]  [place] [address]                Thank you for allowing me to participate in the care of your patient.      Sincerely,     Christopher Martin MD     [facility]    cc:   Christopher Martin MD  [id number]  [place] [address]

## 2019-06-20 DIAGNOSIS — I10 ESSENTIAL HYPERTENSION, BENIGN: ICD-10-CM

## 2019-06-20 DIAGNOSIS — E78.00 HYPERCHOLESTEROLEMIA: ICD-10-CM

## 2019-06-20 RX ORDER — ROSUVASTATIN CALCIUM 40 MG/1
40 TABLET, COATED ORAL DAILY
Qty: 90 TABLET | Refills: 3 | Status: SHIPPED | OUTPATIENT
Start: 2019-06-20 | End: 2019-07-26

## 2019-06-20 RX ORDER — NEBIVOLOL 5 MG/1
5 TABLET ORAL DAILY
Qty: 90 TABLET | Refills: 3 | Status: SHIPPED | OUTPATIENT
Start: 2019-06-20 | End: 2019-07-26

## 2019-06-20 RX ORDER — LOSARTAN POTASSIUM 25 MG/1
25 TABLET ORAL DAILY
Qty: 90 TABLET | Refills: 3 | Status: SHIPPED | OUTPATIENT
Start: 2019-06-20 | End: 2019-07-26

## 2019-07-26 DIAGNOSIS — I10 ESSENTIAL HYPERTENSION, BENIGN: ICD-10-CM

## 2019-07-26 DIAGNOSIS — E78.00 HYPERCHOLESTEROLEMIA: ICD-10-CM

## 2019-07-26 DIAGNOSIS — I25.10 CORONARY ARTERY DISEASE INVOLVING NATIVE CORONARY ARTERY OF NATIVE HEART WITHOUT ANGINA PECTORIS: ICD-10-CM

## 2019-07-26 RX ORDER — ROSUVASTATIN CALCIUM 40 MG/1
40 TABLET, COATED ORAL DAILY
Qty: 90 TABLET | Refills: 2 | Status: SHIPPED | OUTPATIENT
Start: 2019-07-26 | End: 2020-01-02

## 2019-07-26 RX ORDER — EZETIMIBE 10 MG/1
10 TABLET ORAL DAILY
Qty: 90 TABLET | Refills: 2 | Status: SHIPPED | OUTPATIENT
Start: 2019-07-26 | End: 2020-01-02

## 2019-07-26 RX ORDER — NEBIVOLOL 5 MG/1
5 TABLET ORAL DAILY
Qty: 90 TABLET | Refills: 2 | Status: SHIPPED | OUTPATIENT
Start: 2019-07-26 | End: 2020-01-02

## 2019-07-26 RX ORDER — LOSARTAN POTASSIUM 25 MG/1
25 TABLET ORAL DAILY
Qty: 90 TABLET | Refills: 2 | Status: SHIPPED | OUTPATIENT
Start: 2019-07-26 | End: 2020-01-02

## 2019-11-07 ENCOUNTER — HEALTH MAINTENANCE LETTER (OUTPATIENT)
Age: 64
End: 2019-11-07

## 2020-01-02 ENCOUNTER — TELEPHONE (OUTPATIENT)
Dept: CARDIOLOGY | Facility: CLINIC | Age: 65
End: 2020-01-02

## 2020-01-02 DIAGNOSIS — I25.10 CORONARY ARTERY DISEASE INVOLVING NATIVE CORONARY ARTERY OF NATIVE HEART WITHOUT ANGINA PECTORIS: ICD-10-CM

## 2020-01-02 DIAGNOSIS — I10 ESSENTIAL HYPERTENSION, BENIGN: ICD-10-CM

## 2020-01-02 DIAGNOSIS — E78.00 HYPERCHOLESTEROLEMIA: ICD-10-CM

## 2020-01-02 RX ORDER — LOSARTAN POTASSIUM 25 MG/1
25 TABLET ORAL DAILY
Qty: 90 TABLET | Refills: 1 | Status: SHIPPED | OUTPATIENT
Start: 2020-01-02 | End: 2020-06-19

## 2020-01-02 RX ORDER — NEBIVOLOL 5 MG/1
5 TABLET ORAL DAILY
Qty: 90 TABLET | Refills: 1 | Status: SHIPPED | OUTPATIENT
Start: 2020-01-02 | End: 2020-06-19

## 2020-01-02 RX ORDER — EZETIMIBE 10 MG/1
10 TABLET ORAL DAILY
Qty: 90 TABLET | Refills: 1 | Status: SHIPPED | OUTPATIENT
Start: 2020-01-02 | End: 2020-06-19

## 2020-01-02 RX ORDER — ROSUVASTATIN CALCIUM 40 MG/1
40 TABLET, COATED ORAL DAILY
Qty: 90 TABLET | Refills: 1 | Status: SHIPPED | OUTPATIENT
Start: 2020-01-02 | End: 2020-06-19

## 2020-01-02 NOTE — TELEPHONE ENCOUNTER
Patient called requesting mail order refills for Losartan, bystolic, rosuvastatin, and zetia.   Dose and frequency reviewed with Patient .  Reminded of annual OV due in May 2020.  Prescriptions e scribed.

## 2020-01-14 DIAGNOSIS — I25.10 CORONARY ARTERY DISEASE: ICD-10-CM

## 2020-01-14 RX ORDER — NITROGLYCERIN 0.4 MG/1
0.4 TABLET SUBLINGUAL EVERY 5 MIN PRN
Qty: 25 TABLET | Refills: 2 | Status: SHIPPED | OUTPATIENT
Start: 2020-01-14 | End: 2020-08-11

## 2020-04-22 ENCOUNTER — PRE VISIT (OUTPATIENT)
Dept: CARDIOLOGY | Facility: CLINIC | Age: 65
End: 2020-04-22

## 2020-04-22 NOTE — TELEPHONE ENCOUNTER
Records requested from [facility] and [facility] for OV 4/24/20 w/ Dr Martin.     Need ANAND from [facility], unable to obtain signature given virtual visit.

## 2020-04-23 DIAGNOSIS — I25.10 CORONARY ARTERY DISEASE INVOLVING NATIVE CORONARY ARTERY OF NATIVE HEART WITHOUT ANGINA PECTORIS: ICD-10-CM

## 2020-04-23 LAB
ANION GAP SERPL CALCULATED.3IONS-SCNC: 10.3 MMOL/L (ref 6–17)
BUN SERPL-MCNC: 11 MG/DL (ref 7–30)
CALCIUM SERPL-MCNC: 9.1 MG/DL (ref 8.5–10.5)
CHLORIDE SERPL-SCNC: 104 MMOL/L (ref 98–107)
CHOLEST SERPL-MCNC: 111 MG/DL
CO2 SERPL-SCNC: 29 MMOL/L (ref 23–29)
CREAT SERPL-MCNC: 0.97 MG/DL (ref 0.7–1.3)
GFR SERPL CREATININE-BSD FRML MDRD: 78 ML/MIN/{1.73_M2}
GLUCOSE SERPL-MCNC: 105 MG/DL (ref 70–105)
HDLC SERPL-MCNC: 44 MG/DL
LDLC SERPL CALC-MCNC: 55 MG/DL
NONHDLC SERPL-MCNC: 67 MG/DL
POTASSIUM SERPL-SCNC: 4.3 MMOL/L (ref 3.5–5.1)
SODIUM SERPL-SCNC: 139 MMOL/L (ref 136–145)
TRIGL SERPL-MCNC: 58 MG/DL

## 2020-04-23 PROCEDURE — 80061 LIPID PANEL: CPT | Performed by: INTERNAL MEDICINE

## 2020-04-23 PROCEDURE — 80048 BASIC METABOLIC PNL TOTAL CA: CPT | Performed by: INTERNAL MEDICINE

## 2020-04-23 PROCEDURE — 36415 COLL VENOUS BLD VENIPUNCTURE: CPT | Performed by: INTERNAL MEDICINE

## 2020-04-24 ENCOUNTER — VIRTUAL VISIT (OUTPATIENT)
Dept: CARDIOLOGY | Facility: CLINIC | Age: 65
End: 2020-04-24
Attending: INTERNAL MEDICINE
Payer: COMMERCIAL

## 2020-04-24 VITALS
BODY MASS INDEX: 27.43 KG/M2 | SYSTOLIC BLOOD PRESSURE: 127 MMHG | HEART RATE: 60 BPM | HEIGHT: 73 IN | WEIGHT: 207 LBS | DIASTOLIC BLOOD PRESSURE: 86 MMHG

## 2020-04-24 DIAGNOSIS — N52.9 VASCULOGENIC ERECTILE DYSFUNCTION, UNSPECIFIED VASCULOGENIC ERECTILE DYSFUNCTION TYPE: ICD-10-CM

## 2020-04-24 DIAGNOSIS — I25.10 CORONARY ARTERY DISEASE INVOLVING NATIVE CORONARY ARTERY OF NATIVE HEART WITHOUT ANGINA PECTORIS: Primary | ICD-10-CM

## 2020-04-24 DIAGNOSIS — E78.00 HYPERCHOLESTEROLEMIA: ICD-10-CM

## 2020-04-24 DIAGNOSIS — E66.3 OVERWEIGHT (BMI 25.0-29.9): Chronic | ICD-10-CM

## 2020-04-24 DIAGNOSIS — I10 ESSENTIAL HYPERTENSION, BENIGN: ICD-10-CM

## 2020-04-24 PROCEDURE — 99213 OFFICE O/P EST LOW 20 MIN: CPT | Mod: 95 | Performed by: INTERNAL MEDICINE

## 2020-04-24 ASSESSMENT — MIFFLIN-ST. JEOR: SCORE: 1792.83

## 2020-04-24 NOTE — LETTER
4/24/2020      RE: Adis Prince  [address] [place], [place] [address] [id number]       Dear Colleague,    Thank you for the opportunity to participate in the care of your patient, Adis Prince, at the [facility]. Please see a copy of my visit note below.    HISTORY OF PRESENT ILLNESS:  Adis is a very nice, 64-year-old gentleman who is a brother-in-law to one of my best friends from medical school.  He has a very strong family history of coronary artery disease.     Adis's cardiac history dates back to 2002 when he had an inferior wall myocardial infarction with bare-metal stents placed in all 3 coronary arteries at [facility].  In 2010, a second inferior wall myocardial infarction led to a drug-eluting stent placed in his distal right coronary artery.  The infarct was tiny.  Adis eventually quit smoking cigarettes, which was a major prakash.  He also has problems with erectile dysfunction.      Adis was seen as part of a video visit today due to Covid distancing he states he is doing quite well.    He became motivated to become healthier at the end of the year he weighed 226 pounds.  He states he is exercising probably 4 times a week.  He has tightened up his diet and thinks he is eating healthier.  He is managed to lose 19 pounds of weight so far.  He has no exertional chest, arm, neck, jaw or shoulder discomfort.  No dyspnea on exertion, orthopnea or PND.  No palpitations, lightheadedness, dizziness, syncope or near syncope.  He notes no side effects or problems with his current medical regimen.  He states he is feeling better than he has felt in years.     He has no Covid symptoms.  He is doing a good job of social distancing.    General:  no apparent distress, normal body habitus, sitting upright.  ENT/Mouth:  membranes moist, no nasal discharge.  Normal head shape, no apparent injury or laceration.  Eyes:  no  scleral icterus, normal conjunctivae.  No observed jaundice.  Neck:  no apparent neck swelling.   Chest/Lungs:  No breathing difficulty while speaking.  No audible wheezing.  No cough during conversation.  Cardiovascular:  No obviously elevated jugular venous pressure.    Extremities:  no apparent cyanosis.  Skin:  no xanthelasma.  No facial lacerations.  Neurologic:  Normal arm motion bilateral, no tremors.    Psychiatric:  Alert and oriented x3, calm demeanor    The rest of the comprehensive physical examination is deferred due to public health emergency video visit restrictions.      ASSESSMENT AND PLAN:  Adis appears to be doing quite well from a cardiac standpoint without clinical evidence of ischemia, heart failure or significant arrhythmia.      Blood pressure is very well controlled.      I have congratulated him on his weight loss and his healthy lifestyle.  We talked about the fact that 80% of all morbidity mortality comes down to 4 factors including, not smoking, regular exercise, healthy diet and ideal body weight..      Fasting lipid profile is outstanding, total cholesterol is 111, HDL 44 LDL 55 and triglycerides of 58.     Electrolytes are within normal limits.       I will plan on seeing him back in 1 year.  If he should have any problems, I would be glad to see him sooner.      Thank you for allowing me to participate in his care.     Please do not hesitate to contact me if you have any questions/concerns.     Sincerely,     Christopher Martin MD

## 2020-04-24 NOTE — PROGRESS NOTES
"Adis Prince is a 64 year old male who is being evaluated via a billable video visit.      The patient has been notified of following:     \"This video visit will be conducted via a call between you and your physician/provider. We have found that certain health care needs can be provided without the need for an in-person physical exam.  This service lets us provide the care you need with a video conversation.  If a prescription is necessary we can send it directly to your pharmacy.  If lab work is needed we can place an order for that and you can then stop by our lab to have the test done at a later time.    Video visits are billed at different rates depending on your insurance coverage.  Please reach out to your insurance provider with any questions.    If during the course of the call the physician/provider feels a video visit is not appropriate, you will not be charged for this service.\"    Patient has given verbal consent for Video visit? Yes    How would you like to obtain your AVS? Rohith    Patient would like the video invitation sent by: Send to e-mail at: [email or web address]    Will anyone else be joining your video visit? No      BP:127/86  Pulse:60  Wt:207.0lb    Review Of Systems  Skin: NEGATIVE  Eyes:Ears/Nose/Throat:Reading glasses  Respiratory: NEGATIVE  Cardiovascular:NEGATIVE  Gastrointestinal: NEGATIVE  Genitourinary:NEGATIVE   Musculoskeletal: NEGATIVE  Neurologic: NEGATIVE  Psychiatric: NEGATIVE  Hematologic/Lymphatic/Immunologic: NEGATIVE  Endocrine:  NEGATIVE    Julianne BOSWELL    Video-Visit Details    Type of service:  Video Visit    Video Start Time: 9:08 AM  Video End Time: 9:24 AM    Originating Location (pt. Location): Home    Distant Location (provider location):  [facility]     Mode of Communication:  [facility]    HISTORY OF PRESENT ILLNESS:  Adis is a very nice, 64-year-old gentleman who is a brother-in-law to one of my " best friends from medical school.  He has a very strong family history of coronary artery disease.     Adis's cardiac history dates back to 2002 when he had an inferior wall myocardial infarction with bare-metal stents placed in all 3 coronary arteries at [facility].  In 2010, a second inferior wall myocardial infarction led to a drug-eluting stent placed in his distal right coronary artery.  The infarct was tiny.  Adis eventually quit smoking cigarettes, which was a major prakash.  He also has problems with erectile dysfunction.      Adis was seen as part of a video visit today due to Covid distancing he states he is doing quite well.    He became motivated to become healthier at the end of the year he weighed 226 pounds.  He states he is exercising probably 4 times a week.  He has tightened up his diet and thinks he is eating healthier.  He is managed to lose 19 pounds of weight so far.  He has no exertional chest, arm, neck, jaw or shoulder discomfort.  No dyspnea on exertion, orthopnea or PND.  No palpitations, lightheadedness, dizziness, syncope or near syncope.  He notes no side effects or problems with his current medical regimen.  He states he is feeling better than he has felt in years.     He has no Covid symptoms.  He is doing a good job of social distancing.    General:  no apparent distress, normal body habitus, sitting upright.  ENT/Mouth:  membranes moist, no nasal discharge.  Normal head shape, no apparent injury or laceration.  Eyes:  no scleral icterus, normal conjunctivae.  No observed jaundice.  Neck:  no apparent neck swelling.   Chest/Lungs:  No breathing difficulty while speaking.  No audible wheezing.  No cough during conversation.  Cardiovascular:  No obviously elevated jugular venous pressure.    Extremities:  no apparent cyanosis.  Skin:  no xanthelasma.  No facial lacerations.  Neurologic:  Normal arm motion bilateral, no tremors.    Psychiatric:  Alert and oriented x3, calm  katerin    The rest of the comprehensive physical examination is deferred due to public health emergency video visit restrictions.         ASSESSMENT AND PLAN:  Adis appears to be doing quite well from a cardiac standpoint without clinical evidence of ischemia, heart failure or significant arrhythmia.      Blood pressure is very well controlled.      I have congratulated him on his weight loss and his healthy lifestyle.  We talked about the fact that 80% of all morbidity mortality comes down to 4 factors including, not smoking, regular exercise, healthy diet and ideal body weight..      Fasting lipid profile is outstanding, total cholesterol is 111, HDL 44 LDL 55 and triglycerides of 58.     Electrolytes are within normal limits.       I will plan on seeing him back in 1 year.  If he should have any problems, I would be glad to see him sooner.      Thank you for allowing me to participate in his care.       Christopher Martin MD

## 2020-06-19 DIAGNOSIS — E78.00 HYPERCHOLESTEROLEMIA: ICD-10-CM

## 2020-06-19 DIAGNOSIS — I25.10 CORONARY ARTERY DISEASE INVOLVING NATIVE CORONARY ARTERY OF NATIVE HEART WITHOUT ANGINA PECTORIS: ICD-10-CM

## 2020-06-19 DIAGNOSIS — I10 ESSENTIAL HYPERTENSION, BENIGN: ICD-10-CM

## 2020-06-19 RX ORDER — EZETIMIBE 10 MG/1
10 TABLET ORAL DAILY
Qty: 90 TABLET | Refills: 2 | Status: SHIPPED | OUTPATIENT
Start: 2020-06-19 | End: 2021-03-16

## 2020-06-19 RX ORDER — NEBIVOLOL 5 MG/1
5 TABLET ORAL DAILY
Qty: 90 TABLET | Refills: 2 | Status: SHIPPED | OUTPATIENT
Start: 2020-06-19 | End: 2021-03-16

## 2020-06-19 RX ORDER — LOSARTAN POTASSIUM 25 MG/1
25 TABLET ORAL DAILY
Qty: 90 TABLET | Refills: 2 | Status: SHIPPED | OUTPATIENT
Start: 2020-06-19 | End: 2021-03-16

## 2020-06-19 RX ORDER — EZETIMIBE 10 MG/1
10 TABLET ORAL DAILY
Qty: 90 TABLET | Refills: 2 | Status: SHIPPED | OUTPATIENT
Start: 2020-06-19 | End: 2020-06-19

## 2020-06-19 RX ORDER — ROSUVASTATIN CALCIUM 40 MG/1
40 TABLET, COATED ORAL DAILY
Qty: 90 TABLET | Refills: 2 | Status: SHIPPED | OUTPATIENT
Start: 2020-06-19 | End: 2021-03-16

## 2020-08-11 ENCOUNTER — TELEPHONE (OUTPATIENT)
Dept: CARDIOLOGY | Facility: CLINIC | Age: 65
End: 2020-08-11

## 2020-08-11 DIAGNOSIS — I25.10 CORONARY ARTERY DISEASE: ICD-10-CM

## 2020-08-11 RX ORDER — NITROGLYCERIN 0.4 MG/1
0.4 TABLET SUBLINGUAL EVERY 5 MIN PRN
Qty: 25 TABLET | Refills: 1 | Status: SHIPPED | OUTPATIENT
Start: 2020-08-11 | End: 2022-05-02

## 2020-08-11 NOTE — TELEPHONE ENCOUNTER
Call from patient, he states last night he was awakened by a fast heart rate. His watch recorded it at 130 BPM. This lasted for 2-3 hours. He states this is a new event for him.  Patient states he is maintaining his usual diet and exercise routines. Resting HR is 55 BPM, he exercises to  BPM. Patient denies any chest discomfort or dyspnea with the episode.  Reviewed with patient that several types of rhythm could cause that sensation, some benign and some that need treatment. His watch does not capture afib. He is willing to wear a monitor if needed but is comfortable monitoring for a 2nd episode and hopes that will never happen.    Patient has a history of CAD/stents. Due for 1 year visit in April 2021.    Will message Dr. Martin to review

## 2020-08-11 NOTE — TELEPHONE ENCOUNTER
I think we can wait for a second event.  If he does then I would suggest a monitor depending on how frequently he is having it.

## 2020-08-11 NOTE — TELEPHONE ENCOUNTER
Spoke with patient, he will call if he has a similar rapid rate episode or notices any palpitations.   Patient requested a refill of his NTG as his bottle got wet and the pills are not useable. Rx escripted.

## 2020-12-06 ENCOUNTER — HEALTH MAINTENANCE LETTER (OUTPATIENT)
Age: 65
End: 2020-12-06

## 2021-03-16 DIAGNOSIS — I10 ESSENTIAL HYPERTENSION, BENIGN: ICD-10-CM

## 2021-03-16 DIAGNOSIS — I25.10 CORONARY ARTERY DISEASE INVOLVING NATIVE CORONARY ARTERY OF NATIVE HEART WITHOUT ANGINA PECTORIS: ICD-10-CM

## 2021-03-16 DIAGNOSIS — E78.00 HYPERCHOLESTEROLEMIA: ICD-10-CM

## 2021-03-16 RX ORDER — EZETIMIBE 10 MG/1
10 TABLET ORAL DAILY
Qty: 90 TABLET | Refills: 0 | Status: SHIPPED | OUTPATIENT
Start: 2021-03-16 | End: 2021-06-08

## 2021-03-16 RX ORDER — LOSARTAN POTASSIUM 25 MG/1
25 TABLET ORAL DAILY
Qty: 90 TABLET | Refills: 0 | Status: SHIPPED | OUTPATIENT
Start: 2021-03-16 | End: 2021-06-08

## 2021-03-16 RX ORDER — NEBIVOLOL 5 MG/1
5 TABLET ORAL DAILY
Qty: 90 TABLET | Refills: 0 | Status: SHIPPED | OUTPATIENT
Start: 2021-03-16 | End: 2021-06-08

## 2021-03-16 RX ORDER — ROSUVASTATIN CALCIUM 40 MG/1
40 TABLET, COATED ORAL DAILY
Qty: 90 TABLET | Refills: 0 | Status: SHIPPED | OUTPATIENT
Start: 2021-03-16 | End: 2021-06-08

## 2021-04-20 ENCOUNTER — PRE VISIT (OUTPATIENT)
Dept: CARDIOLOGY | Facility: CLINIC | Age: 66
End: 2021-04-20

## 2021-04-27 NOTE — TELEPHONE ENCOUNTER
Reply from Trever WANG, ANAND needed to access records.    Attempted to contact patient to request that he reach out to PCP for ANAND update. Left a message for patient to call back if he has a new PCP or needs other help retrieving his records.    Spoke with patient, he has not seen his PCP for a few years. He did see Dermatology to remove some skin cancer sites this year.

## 2021-05-04 DIAGNOSIS — E78.00 HYPERCHOLESTEROLEMIA: ICD-10-CM

## 2021-05-04 DIAGNOSIS — I25.10 CORONARY ARTERY DISEASE INVOLVING NATIVE CORONARY ARTERY OF NATIVE HEART WITHOUT ANGINA PECTORIS: Primary | Chronic | ICD-10-CM

## 2021-05-04 DIAGNOSIS — I10 ESSENTIAL HYPERTENSION, BENIGN: ICD-10-CM

## 2021-05-05 ENCOUNTER — OFFICE VISIT (OUTPATIENT)
Dept: CARDIOLOGY | Facility: CLINIC | Age: 66
End: 2021-05-05
Payer: COMMERCIAL

## 2021-05-05 VITALS
DIASTOLIC BLOOD PRESSURE: 82 MMHG | HEIGHT: 73 IN | SYSTOLIC BLOOD PRESSURE: 128 MMHG | WEIGHT: 203 LBS | OXYGEN SATURATION: 99 % | BODY MASS INDEX: 26.9 KG/M2 | HEART RATE: 52 BPM

## 2021-05-05 DIAGNOSIS — I25.10 CORONARY ARTERY DISEASE INVOLVING NATIVE CORONARY ARTERY OF NATIVE HEART WITHOUT ANGINA PECTORIS: Chronic | ICD-10-CM

## 2021-05-05 DIAGNOSIS — E78.00 HYPERCHOLESTEROLEMIA: ICD-10-CM

## 2021-05-05 DIAGNOSIS — I10 ESSENTIAL HYPERTENSION, BENIGN: ICD-10-CM

## 2021-05-05 DIAGNOSIS — I25.10 CORONARY ARTERY DISEASE INVOLVING NATIVE CORONARY ARTERY OF NATIVE HEART WITHOUT ANGINA PECTORIS: Primary | ICD-10-CM

## 2021-05-05 LAB
ANION GAP SERPL CALCULATED.3IONS-SCNC: 3 MMOL/L (ref 3–14)
BUN SERPL-MCNC: 13 MG/DL (ref 7–30)
CALCIUM SERPL-MCNC: 9.2 MG/DL (ref 8.5–10.1)
CHLORIDE SERPL-SCNC: 107 MMOL/L (ref 94–109)
CHOLEST SERPL-MCNC: 112 MG/DL
CO2 SERPL-SCNC: 30 MMOL/L (ref 20–32)
CREAT SERPL-MCNC: 0.77 MG/DL (ref 0.66–1.25)
GFR SERPL CREATININE-BSD FRML MDRD: >90 ML/MIN/{1.73_M2}
GLUCOSE SERPL-MCNC: 100 MG/DL (ref 70–99)
HDLC SERPL-MCNC: 44 MG/DL
LDLC SERPL CALC-MCNC: 50 MG/DL
NONHDLC SERPL-MCNC: 68 MG/DL
POTASSIUM SERPL-SCNC: 4.2 MMOL/L (ref 3.4–5.3)
SODIUM SERPL-SCNC: 140 MMOL/L (ref 133–144)
TRIGL SERPL-MCNC: 90 MG/DL

## 2021-05-05 PROCEDURE — 80061 LIPID PANEL: CPT | Performed by: INTERNAL MEDICINE

## 2021-05-05 PROCEDURE — 36415 COLL VENOUS BLD VENIPUNCTURE: CPT | Performed by: INTERNAL MEDICINE

## 2021-05-05 PROCEDURE — 80048 BASIC METABOLIC PNL TOTAL CA: CPT | Performed by: INTERNAL MEDICINE

## 2021-05-05 PROCEDURE — 99213 OFFICE O/P EST LOW 20 MIN: CPT | Performed by: INTERNAL MEDICINE

## 2021-05-05 ASSESSMENT — MIFFLIN-ST. JEOR: SCORE: 1769.68

## 2021-05-05 NOTE — PROGRESS NOTES
HPI and Plan:   See dictation    Orders Placed This Encounter   Procedures     Basic metabolic panel     Lipid Profile     Follow-Up with Cardiologist       No orders of the defined types were placed in this encounter.      There are no discontinued medications.      Encounter Diagnoses   Name Primary?     Coronary artery disease involving native coronary artery of native heart without angina pectoris Yes     Hypercholesterolemia      Essential hypertension, benign        CURRENT MEDICATIONS:  Current Outpatient Medications   Medication Sig Dispense Refill     aspirin 81 MG tablet Take by mouth daily       ezetimibe (ZETIA) 10 MG tablet Take 1 tablet (10 mg) by mouth daily 90 tablet 0     losartan (COZAAR) 25 MG tablet Take 1 tablet (25 mg) by mouth daily 90 tablet 0     Multiple Vitamins-Minerals (MULTIVITAMIN OR) Take by mouth daily       nebivolol (BYSTOLIC) 5 MG tablet Take 1 tablet (5 mg) by mouth daily 90 tablet 0     nitroGLYcerin (NITROSTAT) 0.4 MG sublingual tablet Place 1 tablet (0.4 mg) under the tongue every 5 minutes as needed for chest pain 25 tablet 1     Omega-3 Fatty Acids (FISH OIL) 500 MG CAPS Take 1,500 mg by mouth daily       rosuvastatin (CRESTOR) 40 MG tablet Take 1 tablet (40 mg) by mouth daily 90 tablet 0     tadalafil (CIALIS) 20 MG tablet Take 1 tablet (20 mg) by mouth as needed (as desired) 14 tablet 3       ALLERGIES   No Known Allergies    PAST MEDICAL HISTORY:  Past Medical History:   Diagnosis Date     Benign essential hypertension      CAD (coronary artery disease)     cardiac cath 2010: ALEJANDRA to RCA; cardiac cath 2002: BMS to RCA, mid LAD, mid circumflex     Hypercholesterolemia      Old myocardial infarction     inferior MI 2010, inferior MI 2002       PAST SURGICAL HISTORY:  Past Surgical History:   Procedure Laterality Date     HEART CATH CORONARY ANGIOGRAM W/LV GRAM  1/2002    @ [facility] Bare Metal stents X 4,  distal RCA, mid :AD and mid Circumflex     HEART CATH CORONARY ANGIOGRAM  W/LV GRAM  2/2010    2/10 Mid RCA thrombotic 90 % lesion stented       FAMILY HISTORY:  Family History   Problem Relation Age of Onset     Heart Disease Father         MI at 58       SOCIAL HISTORY:  Social History     Socioeconomic History     Marital status:      Spouse name: None     Number of children: None     Years of education: None     Highest education level: None   Occupational History     None   Social Needs     Financial resource strain: None     Food insecurity     Worry: None     Inability: None     Transportation needs     Medical: None     Non-medical: None   Tobacco Use     Smoking status: Current Some Day Smoker     Types: Cigars     Smokeless tobacco: Never Used     Tobacco comment: 3-4 per year   Substance and Sexual Activity     Alcohol use: Yes     Alcohol/week: 0.0 standard drinks     Comment: 1-2 times per week     Drug use: No     Sexual activity: None   Lifestyle     Physical activity     Days per week: None     Minutes per session: None     Stress: None   Relationships     Social connections     Talks on phone: None     Gets together: None     Attends [employer] service: None     Active member of club or organization: None     Attends meetings of clubs or organizations: None     Relationship status: None     Intimate partner violence     Fear of current or ex partner: None     Emotionally abused: None     Physically abused: None     Forced sexual activity: None   Other Topics Concern      Service Not Asked     Blood Transfusions Not Asked     Caffeine Concern No     Comment: 2-3 cups per day     Occupational Exposure Not Asked     Hobby Hazards Not Asked     Sleep Concern No     Stress Concern No     Weight Concern Not Asked     Special Diet Yes     Comment: low fat, high fiber     Back Care No     Exercise No     Bike Helmet Not Asked     Seat Belt Not Asked     Self-Exams Not Asked     Parent/sibling w/ CABG, MI or angioplasty before 65F 55M? No   Social History Narrative  "    None       Review of Systems:  Skin:  Positive for   recent squamous cell removal   Eyes:  Negative      ENT:  Positive for hearing loss    Respiratory:  Negative       Cardiovascular:    Positive for;dizziness slight-when stands up too fast  Gastroenterology: Negative      Genitourinary:  Negative      Musculoskeletal:  Negative      Neurologic:  Positive for numbness or tingling of hands right arm  Psychiatric:  Negative      Heme/Lymph/Imm:  Negative      Endocrine:  Negative        Physical Exam:  Vitals: /82   Pulse 52   Ht 1.854 m (6' 1\")   Wt 92.1 kg (203 lb)   SpO2 99%   BMI 26.78 kg/m      Constitutional:  cooperative, alert and oriented, well developed, well nourished, in no acute distress        Skin:  warm and dry to the touch, no apparent skin lesions or masses noted          Head:  normocephalic, no masses or lesions        Eyes:  pupils equal and round, conjunctivae and lids unremarkable, sclera white, no xanthalasma, EOMS intact, no nystagmus        Lymph:      ENT:  no pallor or cyanosis, dentition good        Neck:  carotid pulses are full and equal bilaterally;no carotid bruit        Respiratory:  normal breath sounds, clear to auscultation, normal A-P diameter, normal symmetry, normal respiratory excursion, no use of accessory muscles         Cardiac: regular rhythm;no murmurs, gallops or rubs detected;normal S1 and S2                pulses full and equal                                        GI:           Extremities and Muscular Skeletal:  no edema;no spinal abnormalities noted;normal muscle strength and tone              Neurological:  no gross motor deficits        Psych:  affect appropriate, oriented to time, person and place        CC  No referring provider defined for this encounter.              "

## 2021-05-05 NOTE — PROGRESS NOTES
Service Date: 05/05/2021    Adis is a very nice 65-year-old gentleman who is a brother-in-law to one of my best friends from medical school.  He has a very strong family history of coronary artery disease.    Adis's cardiac history dates back to 2002 when he had an inferior wall myocardial infarction with bare metal stents placed in all 3 coronary arteries at [facility].  In 2010, a second inferior wall myocardial infarction led to a drug-eluting stent placed in his distal right coronary artery.  The infarct was tiny.  Adis eventually quit smoking cigarettes, which was a major prakash.  He also has problems with erectile dysfunction, hypercholesterolemia and hypertension.    Adis returns to clinic at this time stating that he is doing great.  He sold his place in [email or web address], moved up to [facility], he has a puppy.  He states he and his wife had gained some weight and were being inactive and at the end of December turned over a new leaf.  They are following the [employer] program.  He is walking daily, getting at least 10,000 steps.  He states that he has turned the commuter time that he was using to come in from [facility] into exercise time.  He has lost 34 pounds of weight.  He states he feels great.  His aches and pains are all gone.  He is sleeping better at nighttime and he states he just feels much better.  He has gotten both vaccines.    He is having no chest, arm, neck, jaw or shoulder discomfort.  No dyspnea on exertion, orthopnea or PND.  No palpitations, lightheadedness, dizziness, syncope or near syncope.  He is having no problems with his current medical regimen.  He states he is feeling better than he has felt in years.    ASSESSMENT AND PLAN:      Adis appears to be doing well from a cardiac standpoint without clinical evidence of ischemia, heart failure or significant arrhythmia.    Blood pressure is outstanding at 128/82 with a pulse of 52.    Weight is 203 pounds, down 17 pounds on my scale  over the last 2 years.    Fasting lipid profile is outstanding.  Total cholesterol is 112, HDL 44, LDL is 50, triglycerides are 90.  We talked about the various parameters and what they mean and this is probably the best lipid profile he has ever had and I have told him this is attributed to his exercise regimen, weight loss and diet.    Basic metabolic profile shows a normal creatinine and normal electrolytes.  His glucose is normal at 100.  He does appear to have a little bit of glucose intolerance, but clearly diet, exercise and weight loss is helping that.     We also talked about the fact that his improved lifestyle probably does improve his quality of sleep at nighttime, which he states he feels.    We reviewed all of his medications.  We will continue them all as is.  Follow up in 1 year.  If he should have any problems, I would be glad to see him sooner.    Thank you for allowing me to participate in his care.    Christopher Martin MD, [facility]        D: 2021   T: 2021   MT: prakash    Name:     KEEGAN MYRICKChelle  MRN:      [id number]        Account:      [id number]   :      1955           Service Date: 2021       Document: [id number]

## 2021-05-05 NOTE — LETTER
5/5/2021    Mary SEPULVEDA [facility] [address] [place] [address]    RE: Adis Prince       Dear Colleague,    I had the pleasure of seeing Adis Prince in the [facility].    HPI and Plan:   See dictation    Orders Placed This Encounter   Procedures     Basic metabolic panel     Lipid Profile     Follow-Up with Cardiologist       No orders of the defined types were placed in this encounter.      There are no discontinued medications.      Encounter Diagnoses   Name Primary?     Coronary artery disease involving native coronary artery of native heart without angina pectoris Yes     Hypercholesterolemia      Essential hypertension, benign        CURRENT MEDICATIONS:  Current Outpatient Medications   Medication Sig Dispense Refill     aspirin 81 MG tablet Take by mouth daily       ezetimibe (ZETIA) 10 MG tablet Take 1 tablet (10 mg) by mouth daily 90 tablet 0     losartan (COZAAR) 25 MG tablet Take 1 tablet (25 mg) by mouth daily 90 tablet 0     Multiple Vitamins-Minerals (MULTIVITAMIN OR) Take by mouth daily       nebivolol (BYSTOLIC) 5 MG tablet Take 1 tablet (5 mg) by mouth daily 90 tablet 0     nitroGLYcerin (NITROSTAT) 0.4 MG sublingual tablet Place 1 tablet (0.4 mg) under the tongue every 5 minutes as needed for chest pain 25 tablet 1     Omega-3 Fatty Acids (FISH OIL) 500 MG CAPS Take 1,500 mg by mouth daily       rosuvastatin (CRESTOR) 40 MG tablet Take 1 tablet (40 mg) by mouth daily 90 tablet 0     tadalafil (CIALIS) 20 MG tablet Take 1 tablet (20 mg) by mouth as needed (as desired) 14 tablet 3       ALLERGIES   No Known Allergies    PAST MEDICAL HISTORY:  Past Medical History:   Diagnosis Date     Benign essential hypertension      CAD (coronary artery disease)     cardiac cath 2010: ALEJANDRA to RCA; cardiac cath 2002: BMS to RCA, mid LAD, mid circumflex     Hypercholesterolemia      Old myocardial infarction     inferior MI  2010, inferior MI 2002       PAST SURGICAL HISTORY:  Past Surgical History:   Procedure Laterality Date     HEART CATH CORONARY ANGIOGRAM W/LV GRAM  1/2002    @ [employer] Bare Metal stents X 4,  distal RCA, mid :AD and mid Circumflex     HEART CATH CORONARY ANGIOGRAM W/LV GRAM  2/2010    2/10 Mid RCA thrombotic 90 % lesion stented       FAMILY HISTORY:  Family History   Problem Relation Age of Onset     Heart Disease Father         MI at 58       SOCIAL HISTORY:  Social History     Socioeconomic History     Marital status:      Spouse name: None     Number of children: None     Years of education: None     Highest education level: None   Occupational History     None   Social Needs     Financial resource strain: None     Food insecurity     Worry: None     Inability: None     Transportation needs     Medical: None     Non-medical: None   Tobacco Use     Smoking status: Current Some Day Smoker     Types: Cigars     Smokeless tobacco: Never Used     Tobacco comment: 3-4 per year   Substance and Sexual Activity     Alcohol use: Yes     Alcohol/week: 0.0 standard drinks     Comment: 1-2 times per week     Drug use: No     Sexual activity: None   Lifestyle     Physical activity     Days per week: None     Minutes per session: None     Stress: None   Relationships     Social connections     Talks on phone: None     Gets together: None     Attends Taoism service: None     Active member of club or organization: None     Attends meetings of clubs or organizations: None     Relationship status: None     Intimate partner violence     Fear of current or ex partner: None     Emotionally abused: None     Physically abused: None     Forced sexual activity: None   Other Topics Concern      Service Not Asked     Blood Transfusions Not Asked     Caffeine Concern No     Comment: 2-3 cups per day     Occupational Exposure Not Asked     Hobby Hazards Not Asked     Sleep Concern No     Stress Concern No     Weight Concern  "Not Asked     Special Diet Yes     Comment: low fat, high fiber     Back Care No     Exercise No     Bike Helmet Not Asked     Seat Belt Not Asked     Self-Exams Not Asked     Parent/sibling w/ CABG, MI or angioplasty before 65F 55M? No   Social History Narrative     None       Review of Systems:  Skin:  Positive for   recent squamous cell removal   Eyes:  Negative      ENT:  Positive for hearing loss    Respiratory:  Negative       Cardiovascular:    Positive for;dizziness slight-when stands up too fast  Gastroenterology: Negative      Genitourinary:  Negative      Musculoskeletal:  Negative      Neurologic:  Positive for numbness or tingling of hands right arm  Psychiatric:  Negative      Heme/Lymph/Imm:  Negative      Endocrine:  Negative        Physical Exam:  Vitals: /82   Pulse 52   Ht 1.854 m (6' 1\")   Wt 92.1 kg (203 lb)   SpO2 99%   BMI 26.78 kg/m      Constitutional:  cooperative, alert and oriented, well developed, well nourished, in no acute distress        Skin:  warm and dry to the touch, no apparent skin lesions or masses noted          Head:  normocephalic, no masses or lesions        Eyes:  pupils equal and round, conjunctivae and lids unremarkable, sclera white, no xanthalasma, EOMS intact, no nystagmus        Lymph:      ENT:  no pallor or cyanosis, dentition good        Neck:  carotid pulses are full and equal bilaterally;no carotid bruit        Respiratory:  normal breath sounds, clear to auscultation, normal A-P diameter, normal symmetry, normal respiratory excursion, no use of accessory muscles         Cardiac: regular rhythm;no murmurs, gallops or rubs detected;normal S1 and S2                pulses full and equal                                        GI:           Extremities and Muscular Skeletal:  no edema;no spinal abnormalities noted;normal muscle strength and tone              Neurological:  no gross motor deficits        Psych:  affect appropriate, oriented to time, person " and place      Thank you for allowing me to participate in the care of your patient.      Sincerely,     Christopher Martin MD     [facility]    cc:   No referring provider defined for this encounter.

## 2021-06-08 DIAGNOSIS — E78.00 HYPERCHOLESTEROLEMIA: ICD-10-CM

## 2021-06-08 DIAGNOSIS — I10 ESSENTIAL HYPERTENSION, BENIGN: ICD-10-CM

## 2021-06-08 DIAGNOSIS — I25.10 CORONARY ARTERY DISEASE INVOLVING NATIVE CORONARY ARTERY OF NATIVE HEART WITHOUT ANGINA PECTORIS: ICD-10-CM

## 2021-06-08 RX ORDER — ROSUVASTATIN CALCIUM 40 MG/1
40 TABLET, COATED ORAL DAILY
Qty: 90 TABLET | Refills: 2 | Status: SHIPPED | OUTPATIENT
Start: 2021-06-08 | End: 2022-03-02

## 2021-06-08 RX ORDER — NEBIVOLOL 5 MG/1
5 TABLET ORAL DAILY
Qty: 90 TABLET | Refills: 2 | Status: SHIPPED | OUTPATIENT
Start: 2021-06-08 | End: 2022-03-03

## 2021-06-08 RX ORDER — LOSARTAN POTASSIUM 25 MG/1
25 TABLET ORAL DAILY
Qty: 90 TABLET | Refills: 2 | Status: SHIPPED | OUTPATIENT
Start: 2021-06-08 | End: 2022-03-03

## 2021-06-08 RX ORDER — EZETIMIBE 10 MG/1
10 TABLET ORAL DAILY
Qty: 90 TABLET | Refills: 2 | Status: SHIPPED | OUTPATIENT
Start: 2021-06-08 | End: 2022-03-02

## 2021-09-25 ENCOUNTER — HEALTH MAINTENANCE LETTER (OUTPATIENT)
Age: 66
End: 2021-09-25

## 2022-01-15 ENCOUNTER — HEALTH MAINTENANCE LETTER (OUTPATIENT)
Age: 67
End: 2022-01-15

## 2022-02-15 ENCOUNTER — MYC MEDICAL ADVICE (OUTPATIENT)
Dept: CARDIOLOGY | Facility: CLINIC | Age: 67
End: 2022-02-15
Payer: COMMERCIAL

## 2022-02-15 DIAGNOSIS — R00.2 PALPITATIONS: Primary | ICD-10-CM

## 2022-02-15 NOTE — TELEPHONE ENCOUNTER
Adis Prince [facility] Team 2  Hi,     My iWatch is reporting a possible Afib event every once in awhile.  My pulse at these times is usually higher than normal.  Please advise.     Adis Olson sent back to patient to get more information- is he in it now? What is his HR? How often is it happening? Does he have any symptoms? No history of afib in chart.     Pt reported an episode of afib 8/2020 and at that time was advised to monitor for recurrence. Not formally documented with heart monitor or EKG.

## 2022-02-16 NOTE — TELEPHONE ENCOUNTER
Adis Prince Stephen C, MD 15 hours ago (5:06 PM)     SS      Pulse right now is normal, mid-sixties.  My guess is that the watch has told me about 5-6 instances over the last three weeks.  No observed symptoms at all.  If the watch hadn't notified me, I would not have thought about it.   The only symptom was a pulse rate @90 when the watch went off.      Routed to Dr Martin.

## 2022-02-16 NOTE — TELEPHONE ENCOUNTER
Christopher Martin MD  [facility] Heart Team 2  Let's set up a 2 week Zio patch       Order placed for monitor, message sent to patient to call and schedule.

## 2022-02-23 ENCOUNTER — HOSPITAL ENCOUNTER (OUTPATIENT)
Dept: CARDIOLOGY | Facility: CLINIC | Age: 67
Discharge: HOME | End: 2022-02-23
Attending: INTERNAL MEDICINE | Admitting: INTERNAL MEDICINE
Payer: COMMERCIAL

## 2022-02-23 DIAGNOSIS — R00.2 PALPITATIONS: ICD-10-CM

## 2022-02-23 PROCEDURE — 93242 EXT ECG>48HR<7D RECORDING: CPT

## 2022-02-23 PROCEDURE — 93248 EXT ECG>7D<15D REV&INTERPJ: CPT | Performed by: INTERNAL MEDICINE

## 2022-03-02 DIAGNOSIS — E78.00 HYPERCHOLESTEROLEMIA: ICD-10-CM

## 2022-03-02 DIAGNOSIS — I25.10 CORONARY ARTERY DISEASE INVOLVING NATIVE CORONARY ARTERY OF NATIVE HEART WITHOUT ANGINA PECTORIS: ICD-10-CM

## 2022-03-02 RX ORDER — ROSUVASTATIN CALCIUM 40 MG/1
40 TABLET, COATED ORAL DAILY
Qty: 90 TABLET | Refills: 0 | Status: SHIPPED | OUTPATIENT
Start: 2022-03-02 | End: 2022-05-31

## 2022-03-02 RX ORDER — EZETIMIBE 10 MG/1
10 TABLET ORAL DAILY
Qty: 90 TABLET | Refills: 0 | Status: SHIPPED | OUTPATIENT
Start: 2022-03-02 | End: 2022-06-01

## 2022-03-03 DIAGNOSIS — I10 ESSENTIAL HYPERTENSION, BENIGN: ICD-10-CM

## 2022-03-03 RX ORDER — LOSARTAN POTASSIUM 25 MG/1
25 TABLET ORAL DAILY
Qty: 90 TABLET | Refills: 0 | Status: SHIPPED | OUTPATIENT
Start: 2022-03-03 | End: 2022-06-01

## 2022-03-03 RX ORDER — NEBIVOLOL 5 MG/1
5 TABLET ORAL DAILY
Qty: 90 TABLET | Refills: 0 | Status: SHIPPED | OUTPATIENT
Start: 2022-03-03 | End: 2022-03-15 | Stop reason: DRUGHIGH

## 2022-03-11 ENCOUNTER — TELEPHONE (OUTPATIENT)
Dept: CARDIOLOGY | Facility: CLINIC | Age: 67
End: 2022-03-11
Payer: COMMERCIAL

## 2022-03-11 DIAGNOSIS — I48.20 CHRONIC ATRIAL FIBRILLATION (H): Primary | ICD-10-CM

## 2022-03-11 NOTE — TELEPHONE ENCOUNTER
Spoke with patient who denies any symptoms of dizziness, light headedness, shortness of breath of chest discomfort.  Confirms he is only on an ASA 81 mg daily and other medications as listed in Epic verified.   Aware that HR is irregular and average in the 80's. Now for about 1 month.   - Currently in [place] for another week,     Leadless monitor 2-23-22 to 3-2-22  Principal rhythm Persistent A Fib.(100% burden),  Average rage 89 bpm. Ranges 43 - 213.  1 Run VT 6 beats max rate 169 bpm, average rate 154.   Isolated VE's.     Next Dr. Martin's annual visit  With labs 5-24-22.   Last Dr. Martin's visit 5-5-21  Currently on ASA 81 mg daily

## 2022-03-14 NOTE — TELEPHONE ENCOUNTER
I talked to Adis. We will increase Bystolic to 10mg/day 90 pills 3 refills and start apixaban 5 bid 180 pills 3 refills. Follow up in April or May as ordered.Thanks

## 2022-03-15 PROBLEM — I48.20 CHRONIC ATRIAL FIBRILLATION (H): Status: ACTIVE | Noted: 2022-03-15

## 2022-03-15 RX ORDER — NEBIVOLOL 10 MG/1
10 TABLET ORAL DAILY
Qty: 90 TABLET | Refills: 3 | Status: SHIPPED | OUTPATIENT
Start: 2022-03-15 | End: 2023-06-09

## 2022-03-15 NOTE — TELEPHONE ENCOUNTER
Spoke with patient, he is out of town and asks that we send his scripts to [facility] mail order pharmacy. He will use up his bytolic 5mg tabs taking 2 daily and then contact [facility] to fill the new 10mg pills. Rx escripted for eliquis 5mg BID and bystolic 10mg daily.

## 2022-03-16 ENCOUNTER — TELEPHONE (OUTPATIENT)
Dept: CARDIOLOGY | Facility: CLINIC | Age: 67
End: 2022-03-16
Payer: COMMERCIAL

## 2022-03-16 DIAGNOSIS — I48.20 CHRONIC ATRIAL FIBRILLATION (H): Primary | ICD-10-CM

## 2022-03-16 DIAGNOSIS — I25.10 CORONARY ARTERY DISEASE INVOLVING NATIVE CORONARY ARTERY OF NATIVE HEART WITHOUT ANGINA PECTORIS: Primary | ICD-10-CM

## 2022-03-16 DIAGNOSIS — E78.00 HYPERCHOLESTEROLEMIA: ICD-10-CM

## 2022-03-16 NOTE — TELEPHONE ENCOUNTER
Central Prior Authorization Team   Phone: [phone]      PRIOR AUTHORIZATION DENIED    Medication: Eliquis (apixaban)    Denial Date: 3/16/2022    Denial Rational:         Appeal Information: If the provider would like to appeal this denial, please provide a letter of medical necessity. Please also include any therapies that the patient has tried and their outcomes. The patient's insurance company will also require the provider to address why the insurance preferred options are not appropriate in the patient's therapy.  The reason could be that the preferred options will harm the patient; either physically or mentally. They are contraindicated to the patient; or the patient has already been taking the requested medication and changing the therapy would change the outcome of their therapy.    Once it has been completed and placed in the patient's chart, notify the Central PA Team ([facility]) and the appeal can be initiated on behalf of the patient and provider.

## 2022-03-16 NOTE — TELEPHONE ENCOUNTER
Spoke with patient, he was aware of the eliquis denial and agreed to change to xarelto. Rx escripted to [facility] mail order pharmacy.

## 2022-05-02 ENCOUNTER — TELEPHONE (OUTPATIENT)
Dept: CARDIOLOGY | Facility: CLINIC | Age: 67
End: 2022-05-02
Payer: COMMERCIAL

## 2022-05-02 DIAGNOSIS — I25.10 CORONARY ARTERY DISEASE: ICD-10-CM

## 2022-05-02 RX ORDER — NITROGLYCERIN 0.4 MG/1
0.4 TABLET SUBLINGUAL EVERY 5 MIN PRN
Qty: 25 TABLET | Refills: 0 | Status: SHIPPED | OUTPATIENT
Start: 2022-05-02 | End: 2022-06-07

## 2022-05-02 NOTE — TELEPHONE ENCOUNTER
Xarelto is best absorbed with a high fat meal. If he eats a fatty breakfast probably OK. Otherwise apixaban 5mg bid

## 2022-05-02 NOTE — TELEPHONE ENCOUNTER
[facility] Call Center    Phone Message    May a detailed message be left on voicemail: yes     Reason for Call: Other: Adis called stating that he is having a hard time remembering to take his Xarelto at dinner time. Adis is wondering if he can take it with his other medication in the morning and if it has to be taken with food. Please advise. Thank you.      Action Taken: Message routed to:  Other: Bernice    Travel Screening: Not Applicable

## 2022-05-02 NOTE — TELEPHONE ENCOUNTER
Attempted to contact patient with Dr. Martin's reply. Left a message with the update and sent a my chart message for follow up. Asked patient to call with his decision - stay on xartelto daily or start apixaban 5mg BID.      5/3/2022 my chart reply from patient:      Thank you.

## 2022-05-03 ENCOUNTER — PRE VISIT (OUTPATIENT)
Dept: CARDIOLOGY | Facility: CLINIC | Age: 67
End: 2022-05-03
Payer: COMMERCIAL

## 2022-05-03 NOTE — TELEPHONE ENCOUNTER
Good morning, Mr. Prince,    We are preparing your chart for your visit with Dr. Martin on 5/24/2022.  We are unable to access your records in the its learning system. Please connect with their medical records department to sign a Release of Information (ANAND). These often have to be redone annually.    Dr. Donaldson's office should be able to help you open their system so Dr. Martin and review any needed records.    Thank you for your help  Team 2 RNs  [phone]

## 2022-05-24 ENCOUNTER — LAB (OUTPATIENT)
Dept: LAB | Facility: CLINIC | Age: 67
End: 2022-05-24
Attending: INTERNAL MEDICINE
Payer: COMMERCIAL

## 2022-05-24 ENCOUNTER — OFFICE VISIT (OUTPATIENT)
Dept: CARDIOLOGY | Facility: CLINIC | Age: 67
End: 2022-05-24
Attending: INTERNAL MEDICINE
Payer: COMMERCIAL

## 2022-05-24 VITALS
BODY MASS INDEX: 28.1 KG/M2 | SYSTOLIC BLOOD PRESSURE: 107 MMHG | WEIGHT: 212 LBS | DIASTOLIC BLOOD PRESSURE: 74 MMHG | HEART RATE: 80 BPM | HEIGHT: 73 IN

## 2022-05-24 DIAGNOSIS — I48.20 CHRONIC ATRIAL FIBRILLATION (H): ICD-10-CM

## 2022-05-24 DIAGNOSIS — I10 ESSENTIAL HYPERTENSION, BENIGN: ICD-10-CM

## 2022-05-24 DIAGNOSIS — E78.00 HYPERCHOLESTEROLEMIA: ICD-10-CM

## 2022-05-24 DIAGNOSIS — I48.20 CHRONIC ATRIAL FIBRILLATION (H): Primary | ICD-10-CM

## 2022-05-24 DIAGNOSIS — I25.10 CORONARY ARTERY DISEASE INVOLVING NATIVE CORONARY ARTERY OF NATIVE HEART WITHOUT ANGINA PECTORIS: ICD-10-CM

## 2022-05-24 DIAGNOSIS — I48.21 PERMANENT ATRIAL FIBRILLATION (H): ICD-10-CM

## 2022-05-24 LAB
ANION GAP SERPL CALCULATED.3IONS-SCNC: 4 MMOL/L (ref 3–14)
BUN SERPL-MCNC: 16 MG/DL (ref 7–30)
CALCIUM SERPL-MCNC: 9.1 MG/DL (ref 8.5–10.1)
CHLORIDE BLD-SCNC: 108 MMOL/L (ref 94–109)
CHOLEST SERPL-MCNC: 112 MG/DL
CO2 SERPL-SCNC: 28 MMOL/L (ref 20–32)
CREAT SERPL-MCNC: 0.84 MG/DL (ref 0.66–1.25)
FASTING STATUS PATIENT QL REPORTED: YES
GFR SERPL CREATININE-BSD FRML MDRD: >90 ML/MIN/1.73M2
GLUCOSE BLD-MCNC: 81 MG/DL (ref 70–99)
HDLC SERPL-MCNC: 45 MG/DL
LDLC SERPL CALC-MCNC: 49 MG/DL
NONHDLC SERPL-MCNC: 67 MG/DL
POTASSIUM BLD-SCNC: 4.3 MMOL/L (ref 3.4–5.3)
SODIUM SERPL-SCNC: 140 MMOL/L (ref 133–144)
TRIGL SERPL-MCNC: 88 MG/DL
TSH SERPL DL<=0.005 MIU/L-ACNC: 2.27 MU/L (ref 0.4–4)

## 2022-05-24 PROCEDURE — 36415 COLL VENOUS BLD VENIPUNCTURE: CPT | Performed by: INTERNAL MEDICINE

## 2022-05-24 PROCEDURE — 99215 OFFICE O/P EST HI 40 MIN: CPT | Performed by: INTERNAL MEDICINE

## 2022-05-24 PROCEDURE — 80048 BASIC METABOLIC PNL TOTAL CA: CPT | Performed by: INTERNAL MEDICINE

## 2022-05-24 PROCEDURE — 80061 LIPID PANEL: CPT | Performed by: INTERNAL MEDICINE

## 2022-05-24 PROCEDURE — 84443 ASSAY THYROID STIM HORMONE: CPT | Performed by: INTERNAL MEDICINE

## 2022-05-24 NOTE — PROGRESS NOTES
HPI and Plan:   See dictation    Today's clinic visit entailed:  Review of the result(s) of each unique test - Lab work  Ordering of each unique test  Prescription drug management  40   minutes spent on the date of the encounter doing chart review, history and exam, documentation and further activities per the note  Provider  Link to [facility] Help Grid     The level of medical decision making during this visit was of moderate complexity.      Orders Placed This Encounter   Procedures     TSH with free T4 reflex     Lipid Profile     Basic metabolic panel     Follow-Up with Cardiology     Echocardiogram Complete       No orders of the defined types were placed in this encounter.      There are no discontinued medications.      Encounter Diagnoses   Name Primary?     Coronary artery disease involving native coronary artery of native heart without angina pectoris      Chronic atrial fibrillation (H) Yes     Permanent atrial fibrillation (H)      Hypercholesterolemia      Essential hypertension, benign        CURRENT MEDICATIONS:  Current Outpatient Medications   Medication Sig Dispense Refill     aspirin 81 MG tablet Take by mouth daily       ezetimibe (ZETIA) 10 MG tablet Take 1 tablet (10 mg) by mouth daily 90 tablet 0     losartan (COZAAR) 25 MG tablet Take 1 tablet (25 mg) by mouth daily 90 tablet 0     nebivolol (BYSTOLIC) 10 MG tablet Take 1 tablet (10 mg) by mouth daily 90 tablet 3     nitroGLYcerin (NITROSTAT) 0.4 MG sublingual tablet Place 1 tablet (0.4 mg) under the tongue every 5 minutes as needed for chest pain 25 tablet 0     Omega-3 Fatty Acids (FISH OIL) 500 MG CAPS Take 1,500 mg by mouth daily       rivaroxaban ANTICOAGULANT (XARELTO ANTICOAGULANT) 20 MG TABS tablet Take 1 tablet (20 mg) by mouth daily (with dinner) 90 tablet 3     rosuvastatin (CRESTOR) 40 MG tablet Take 1 tablet (40 mg) by mouth daily 90 tablet 0     Multiple Vitamins-Minerals (MULTIVITAMIN OR) Take by mouth daily       tadalafil (CIALIS)  20 MG tablet Take 1 tablet (20 mg) by mouth as needed (as desired) (Patient not taking: Reported on 5/24/2022) 14 tablet 3       ALLERGIES   No Known Allergies    PAST MEDICAL HISTORY:  Past Medical History:   Diagnosis Date     Benign essential hypertension      CAD (coronary artery disease)     cardiac cath 2010: ALEJANDRA to RCA; cardiac cath 2002: BMS to RCA, mid LAD, mid circumflex     Chronic atrial fibrillation (H)      Hypercholesterolemia      Old myocardial infarction     inferior MI 2010, inferior MI 2002       PAST SURGICAL HISTORY:  Past Surgical History:   Procedure Laterality Date     HEART CATH CORONARY ANGIOGRAM W/LV GRAM  1/2002    @ [employer] Bare Metal stents X 4,  distal RCA, mid :AD and mid Circumflex     HEART CATH CORONARY ANGIOGRAM W/LV GRAM  2/2010    2/10 Mid RCA thrombotic 90 % lesion stented       FAMILY HISTORY:  Family History   Problem Relation Age of Onset     Heart Disease Father         MI at 58       SOCIAL HISTORY:  Social History     Socioeconomic History     Marital status:      Spouse name: None     Number of children: None     Years of education: None     Highest education level: None   Tobacco Use     Smoking status: Former Smoker     Types: Cigars     Smokeless tobacco: Never Used     Tobacco comment: 3-4 per year   Substance and Sexual Activity     Alcohol use: Yes     Alcohol/week: 0.0 standard drinks     Comment: 1-2 times per week     Drug use: No   Other Topics Concern     Caffeine Concern No     Comment: 2-3 cups per day     Sleep Concern No     Stress Concern No     Special Diet Yes     Comment: low fat, high fiber     Back Care No     Exercise No     Parent/sibling w/ CABG, MI or angioplasty before 65F 55M? No       Review of Systems:  Skin:  Negative       Eyes:  Negative      ENT:  Negative      Respiratory:  Negative shortness of breath;dyspnea on exertion     Cardiovascular:  Negative;palpitations;chest pain;edema;lightheadedness Positive for;dizziness apple watch  "has shown afib but patient doesn't feel it, feels dizzy when standing up too fast  Gastroenterology: Negative      Genitourinary:  Negative      Musculoskeletal:  Negative back pain;neck pain    Neurologic:  Negative headaches    Psychiatric:  Negative      Heme/Lymph/Imm:  Negative allergies    Endocrine:  Negative        Physical Exam:  Vitals: /74 (BP Location: Left arm, Patient Position: Sitting)   Pulse 80   Ht 1.854 m (6' 1\")   Wt 96.2 kg (212 lb)   BMI 27.97 kg/m      Constitutional:  cooperative, alert and oriented, well developed, well nourished, in no acute distress        Skin:  warm and dry to the touch, no apparent skin lesions or masses noted          Head:  normocephalic, no masses or lesions        Eyes:  pupils equal and round, conjunctivae and lids unremarkable, sclera white, no xanthalasma, EOMS intact, no nystagmus        Lymph:      ENT:  no pallor or cyanosis, dentition good        Neck:  carotid pulses are full and equal bilaterally;no carotid bruit        Respiratory:  normal breath sounds, clear to auscultation, normal A-P diameter, normal symmetry, normal respiratory excursion, no use of accessory muscles         Cardiac: no murmurs, gallops or rubs detected;normal S1 and S2 irregularly irregular rhythm           Rate controlled  pulses full and equal                                        GI:           Extremities and Muscular Skeletal:  no edema;no spinal abnormalities noted;normal muscle strength and tone              Neurological:  no gross motor deficits        Psych:  affect appropriate, oriented to time, person and place        CC  Christopher Martin MD  [address]  [place] [id number]              "

## 2022-05-24 NOTE — PROGRESS NOTES
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  He has a very strong family history of coronary artery disease.    Adis's cardiac history dates back to 2002 when he had an inferior wall myocardial infarction with bare metal stents placed in all 3 arteries at [facility].  In 2010, a second inferior wall myocardial infarction led to a drug-eluting stent being placed in his distal right coronary artery. Infarct was tiny.  Adis eventually quit smoking cigarettes which was a major prakash.  He is following a much healthier lifestyle, eating a better diet, watching his weight closely and trying to exercise regularly.  He has problems with erectile dysfunction, hypercholesterolemia and hypertension.    Adis called me this past winter as he had an Apple watch that was telling him he was having episodes of atrial fibrillation.  He states he is completely unaware of these episodes.  He had no chest, arm, neck, jaw or shoulder discomfort.  No dyspnea on exertion, orthopnea or PND.  No lightheadedness, dizziness, syncope or near syncope.  No symptoms to suggest a TIA or CVA.    When I saw Adis last year, he had sold his place in [place] and moved up to [facility] and bought a puppy and was leading a much healthier lifestyle.    He does snore at nighttime.  He states he takes 1 nap in the afternoon.  He is not interested in a sleep study.    ASSESSMENT:  Adis appears to be doing well from a cardiac standpoint without clinical evidence of ischemia or heart failure.    He is in atrial fibrillation by physical exam. We did a Zio Patch in February which showed an average heart rate of 89, ranging from .  He had one 6-beat run of V-tach at 154 beats per minute.  He has not had thyroid function tests checked, so I will send off a TSH.  I will also get an echocardiogram as it has been 12 years since his last echo. As  stated, he is not interested in a sleep study.    Blood pressure is very well controlled at 107/74 with a pulse of 80.    Weight is 212 pounds, which is up from last year 9 pounds, but down 8 pounds from 2 years ago.  Body mass index is 28.    He occasionally drinks alcohol.    He notes no side effects or problems with his current medical regimen.    Fasting lipid profile is outstanding.  Total cholesterol is 112, HDL 45, LDL is 49, triglycerides are 88.    Creatinine is 0.84 with normal electrolytes.    He is playing pickleball, and as he is having no symptoms, I do not think I need to run a stress test at this time.    We talked about AFib ablation.  Other possibilities.  We talked about the fact that the rivaroxaban has to be taken with a fatty meal.  He states his most consistent meal is breakfast, so he was going to take it with breakfast in the morning.  I will plan on visiting in 1 year.  If he should have any problems, I would be glad to see him sooner.    Thank you for allowing me to participate in his care.    Christopher Martin MD, [facility]        D: 2022   T: 2022   MT: [email or web address]    Name:     KEEGAN MYRICK  MRN:      [id number]        Account:      [id number]   :      1955           Service Date: 2022       Document: [id number]

## 2022-05-24 NOTE — LETTER
5/24/2022    Mary SEPULVEDA [facility] [address] [place] [address]    RE: Adis Prince       Dear Colleague,     I had the pleasure of seeing Adis Prince in the [facility].  HPI and Plan:   See dictation    Today's clinic visit entailed:  Review of the result(s) of each unique test - Lab work  Ordering of each unique test  Prescription drug management  40   minutes spent on the date of the encounter doing chart review, history and exam, documentation and further activities per the note  Provider  Link to MDM Help Grid     The level of medical decision making during this visit was of moderate complexity.      Orders Placed This Encounter   Procedures     TSH with free T4 reflex     Lipid Profile     Basic metabolic panel     Follow-Up with Cardiology     Echocardiogram Complete       No orders of the defined types were placed in this encounter.      There are no discontinued medications.      Encounter Diagnoses   Name Primary?     Coronary artery disease involving native coronary artery of native heart without angina pectoris      Chronic atrial fibrillation (H) Yes     Permanent atrial fibrillation (H)      Hypercholesterolemia      Essential hypertension, benign        CURRENT MEDICATIONS:  Current Outpatient Medications   Medication Sig Dispense Refill     aspirin 81 MG tablet Take by mouth daily       ezetimibe (ZETIA) 10 MG tablet Take 1 tablet (10 mg) by mouth daily 90 tablet 0     losartan (COZAAR) 25 MG tablet Take 1 tablet (25 mg) by mouth daily 90 tablet 0     nebivolol (BYSTOLIC) 10 MG tablet Take 1 tablet (10 mg) by mouth daily 90 tablet 3     nitroGLYcerin (NITROSTAT) 0.4 MG sublingual tablet Place 1 tablet (0.4 mg) under the tongue every 5 minutes as needed for chest pain 25 tablet 0     Omega-3 Fatty Acids (FISH OIL) 500 MG CAPS Take 1,500 mg by mouth daily       rivaroxaban ANTICOAGULANT (XARELTO ANTICOAGULANT) 20 MG TABS tablet Take 1 tablet (20  mg) by mouth daily (with dinner) 90 tablet 3     rosuvastatin (CRESTOR) 40 MG tablet Take 1 tablet (40 mg) by mouth daily 90 tablet 0     Multiple Vitamins-Minerals (MULTIVITAMIN OR) Take by mouth daily       tadalafil (CIALIS) 20 MG tablet Take 1 tablet (20 mg) by mouth as needed (as desired) (Patient not taking: Reported on 5/24/2022) 14 tablet 3       ALLERGIES   No Known Allergies    PAST MEDICAL HISTORY:  Past Medical History:   Diagnosis Date     Benign essential hypertension      CAD (coronary artery disease)     cardiac cath 2010: ALEJANDRA to RCA; cardiac cath 2002: BMS to RCA, mid LAD, mid circumflex     Chronic atrial fibrillation (H)      Hypercholesterolemia      Old myocardial infarction     inferior MI 2010, inferior MI 2002       PAST SURGICAL HISTORY:  Past Surgical History:   Procedure Laterality Date     HEART CATH CORONARY ANGIOGRAM W/LV GRAM  1/2002    @ [facility] Bare Metal stents X 4,  distal RCA, mid :AD and mid Circumflex     HEART CATH CORONARY ANGIOGRAM W/LV GRAM  2/2010    2/10 Mid RCA thrombotic 90 % lesion stented       FAMILY HISTORY:  Family History   Problem Relation Age of Onset     Heart Disease Father         MI at 58       SOCIAL HISTORY:  Social History     Socioeconomic History     Marital status:      Spouse name: None     Number of children: None     Years of education: None     Highest education level: None   Tobacco Use     Smoking status: Former Smoker     Types: Cigars     Smokeless tobacco: Never Used     Tobacco comment: 3-4 per year   Substance and Sexual Activity     Alcohol use: Yes     Alcohol/week: 0.0 standard drinks     Comment: 1-2 times per week     Drug use: No   Other Topics Concern     Caffeine Concern No     Comment: 2-3 cups per day     Sleep Concern No     Stress Concern No     Special Diet Yes     Comment: low fat, high fiber     Back Care No     Exercise No     Parent/sibling w/ CABG, MI or angioplasty before 65F 55M? No     Review of Systems:  Skin:   "Negative       Eyes:  Negative      ENT:  Negative      Respiratory:  Negative shortness of breath;dyspnea on exertion     Cardiovascular:  Negative;palpitations;chest pain;edema;lightheadedness Positive for;dizziness apple watch has shown afib but patient doesn't feel it, feels dizzy when standing up too fast  Gastroenterology: Negative      Genitourinary:  Negative      Musculoskeletal:  Negative back pain;neck pain    Neurologic:  Negative headaches    Psychiatric:  Negative      Heme/Lymph/Imm:  Negative allergies    Endocrine:  Negative        Physical Exam:  Vitals: /74 (BP Location: Left arm, Patient Position: Sitting)   Pulse 80   Ht 1.854 m (6' 1\")   Wt 96.2 kg (212 lb)   BMI 27.97 kg/m      Constitutional:  cooperative, alert and oriented, well developed, well nourished, in no acute distress        Skin:  warm and dry to the touch, no apparent skin lesions or masses noted          Head:  normocephalic, no masses or lesions        Eyes:  pupils equal and round, conjunctivae and lids unremarkable, sclera white, no xanthalasma, EOMS intact, no nystagmus        Lymph:      ENT:  no pallor or cyanosis, dentition good        Neck:  carotid pulses are full and equal bilaterally;no carotid bruit        Respiratory:  normal breath sounds, clear to auscultation, normal A-P diameter, normal symmetry, normal respiratory excursion, no use of accessory muscles         Cardiac: no murmurs, gallops or rubs detected;normal S1 and S2 irregularly irregular rhythm           Rate controlled  pulses full and equal                                        GI:           Extremities and Muscular Skeletal:  no edema;no spinal abnormalities noted;normal muscle strength and tone              Neurological:  no gross motor deficits        Psych:  affect appropriate, oriented to time, person and place      CC  Christopher Martin MD  [address]  [place] [address]      Thank you for allowing me to participate in the " care of your patient.      Sincerely,     Christopher Martin MD     [facility]

## 2022-05-25 ENCOUNTER — TELEPHONE (OUTPATIENT)
Dept: CARDIOLOGY | Facility: CLINIC | Age: 67
End: 2022-05-25
Payer: COMMERCIAL

## 2022-05-31 DIAGNOSIS — E78.00 HYPERCHOLESTEROLEMIA: ICD-10-CM

## 2022-05-31 RX ORDER — ROSUVASTATIN CALCIUM 40 MG/1
40 TABLET, COATED ORAL DAILY
Qty: 90 TABLET | Refills: 3 | Status: SHIPPED | OUTPATIENT
Start: 2022-05-31 | End: 2023-05-19

## 2022-06-01 DIAGNOSIS — I10 ESSENTIAL HYPERTENSION, BENIGN: ICD-10-CM

## 2022-06-01 DIAGNOSIS — I25.10 CORONARY ARTERY DISEASE INVOLVING NATIVE CORONARY ARTERY OF NATIVE HEART WITHOUT ANGINA PECTORIS: ICD-10-CM

## 2022-06-01 RX ORDER — LOSARTAN POTASSIUM 25 MG/1
25 TABLET ORAL DAILY
Qty: 90 TABLET | Refills: 3 | Status: SHIPPED | OUTPATIENT
Start: 2022-06-01 | End: 2022-10-27

## 2022-06-01 RX ORDER — EZETIMIBE 10 MG/1
10 TABLET ORAL DAILY
Qty: 90 TABLET | Refills: 3 | Status: SHIPPED | OUTPATIENT
Start: 2022-06-01 | End: 2023-05-19

## 2022-10-24 ENCOUNTER — TELEPHONE (OUTPATIENT)
Dept: CARDIOLOGY | Facility: CLINIC | Age: 67
End: 2022-10-24

## 2022-10-24 DIAGNOSIS — I25.10 CORONARY ARTERY DISEASE: ICD-10-CM

## 2022-10-24 DIAGNOSIS — I10 ESSENTIAL HYPERTENSION, BENIGN: ICD-10-CM

## 2022-10-24 RX ORDER — NITROGLYCERIN 0.4 MG/1
0.4 TABLET SUBLINGUAL EVERY 5 MIN PRN
Qty: 25 TABLET | Refills: 3 | Status: SHIPPED | OUTPATIENT
Start: 2022-10-24 | End: 2023-06-09

## 2022-10-24 NOTE — TELEPHONE ENCOUNTER
[facility] Call Center    Phone Message    May a detailed message be left on voicemail: yes     Reason for Call: Medication Refill Request    Has the patient contacted the pharmacy for the refill? Yes   Name of medication being requested: nitroGLYcerin (NITROSTAT) 0.4 MG sublingual tablet   Provider who prescribed the medication: Dr. Martin  Pharmacy:  [facility] - [place], [place] - [address] AT PORTAL TO [facility] SITES    Date medication is needed: 10/24/22        Action Taken: Other: Cardiology    Travel Screening: Not Applicable

## 2022-10-24 NOTE — TELEPHONE ENCOUNTER
I called Adis because the phone message said he needed the Nitroglycerin 10/24 (today) from [employer]. He said he does not need them today. He said he needs them because he always carries them with him and sometimes they go through the washing machine. He does want them ordered from [employer]. I told him I will  Refill them.     [facility] Cardiology Refill Guideline reviewed.  Medication meets criteria for refill.

## 2022-10-24 NOTE — TELEPHONE ENCOUNTER
Christopher Martin MD  You 2 minutes ago (4:23 PM)     He now lives on [address]. I don't know for sure what his primary is ordering. He can proceed with it and send us a copy. If it is not an Echo we can schedule one.          You  Christopher Martin MD; [facility] Team 2 10 minutes ago (4:15 PM)     He said it has been intermittent since the summer/since starting xarelto, but didn't explicitly say it was from afib.   Would you rather him have the echo or is the PCP order fine?           Christopher Martin MD  You 13 minutes ago (4:12 PM)     I recommended an Echo when I saw him this past spring. I suspect that is what his MD is ordering. He can try stopping losartan and see how he feels. It is possible that in the summer outside he is intravascular volume depleted. His dizziness is not related to his PAF?        Left message to call back.

## 2022-10-24 NOTE — TELEPHONE ENCOUNTER
Spoke to patient to clarify about the dizziness, says his BP today was 106/66 at his PCP visit today. He says his BP has really been stable around 120/80's. He had lost 25lbs prior to the end of last year but otherwise weight is stable. He was told to double his fluid intake going forward. He says the dizziness started over the summer, mostly occurs when he is outside doing chores. Routed to Dr Martin.

## 2022-10-24 NOTE — TELEPHONE ENCOUNTER
[facility] Call Center    Phone Message    May a detailed message be left on voicemail: yes     Reason for Call: Other: Patient calling with two questions for Dr. Martin.     1.) Adis's PCP recommended doing a pulmonary/aortic ultrasound. Is this alright with Dr. Martin?    2.) Adis has been having some dizziness and his PCP thinks that losartan may be contributing to this. Would he be able to stop this medication?    Please call him back to discuss.    Thank you!  [facility]      Action Taken: Other: Cardiology    Travel Screening: Not Applicable

## 2022-10-25 NOTE — TELEPHONE ENCOUNTER
Attempted to contact patient to review Dr. Martin's recommendations. Left message for patient to call back.

## 2022-10-27 RX ORDER — LOSARTAN POTASSIUM 25 MG/1
25 TABLET ORAL DAILY
Qty: 90 TABLET | Refills: 3 | COMMUNITY
Start: 2022-10-27 | End: 2023-06-09

## 2022-10-27 NOTE — TELEPHONE ENCOUNTER
Spoke with patient, he states he is sure there is not correlation between his dizziness and his afb as he has his watch set to monitor his rhythm and they don't match.  He is drinking more water each day to be sure he is staying hydrated.  patient will schedule the image testing ordered by his other provider and endeavor to sign a release so a copy can be sent to Dr. Martin. The test is not scheduled yet.    Patient will continue to monitor home BP and will call if BP is drifting above the goal of <140/90.

## 2022-12-26 ENCOUNTER — HEALTH MAINTENANCE LETTER (OUTPATIENT)
Age: 67
End: 2022-12-26

## 2023-05-16 ENCOUNTER — LAB (OUTPATIENT)
Dept: LAB | Facility: CLINIC | Age: 68
End: 2023-05-16
Attending: INTERNAL MEDICINE
Payer: COMMERCIAL

## 2023-05-16 ENCOUNTER — HOSPITAL ENCOUNTER (OUTPATIENT)
Dept: CARDIOLOGY | Facility: CLINIC | Age: 68
Discharge: HOME | End: 2023-05-16
Attending: INTERNAL MEDICINE | Admitting: INTERNAL MEDICINE
Payer: COMMERCIAL

## 2023-05-16 DIAGNOSIS — I25.10 CORONARY ARTERY DISEASE INVOLVING NATIVE CORONARY ARTERY OF NATIVE HEART WITHOUT ANGINA PECTORIS: ICD-10-CM

## 2023-05-16 DIAGNOSIS — I48.20 CHRONIC ATRIAL FIBRILLATION (H): ICD-10-CM

## 2023-05-16 LAB
ANION GAP SERPL CALCULATED.3IONS-SCNC: 10 MMOL/L (ref 7–15)
BUN SERPL-MCNC: 12.5 MG/DL (ref 8–23)
CALCIUM SERPL-MCNC: 9.4 MG/DL (ref 8.8–10.2)
CHLORIDE SERPL-SCNC: 104 MMOL/L (ref 98–107)
CHOLEST SERPL-MCNC: 113 MG/DL
CREAT SERPL-MCNC: 0.91 MG/DL (ref 0.67–1.17)
DEPRECATED HCO3 PLAS-SCNC: 26 MMOL/L (ref 22–29)
GFR SERPL CREATININE-BSD FRML MDRD: >90 ML/MIN/1.73M2
GLUCOSE SERPL-MCNC: 108 MG/DL (ref 70–99)
HDLC SERPL-MCNC: 41 MG/DL
LDLC SERPL CALC-MCNC: 57 MG/DL
LVEF ECHO: NORMAL
NONHDLC SERPL-MCNC: 72 MG/DL
POTASSIUM SERPL-SCNC: 5.1 MMOL/L (ref 3.4–5.3)
SODIUM SERPL-SCNC: 140 MMOL/L (ref 136–145)
TRIGL SERPL-MCNC: 73 MG/DL

## 2023-05-16 PROCEDURE — 93306 TTE W/DOPPLER COMPLETE: CPT | Mod: 26 | Performed by: INTERNAL MEDICINE

## 2023-05-16 PROCEDURE — 80048 BASIC METABOLIC PNL TOTAL CA: CPT | Performed by: INTERNAL MEDICINE

## 2023-05-16 PROCEDURE — 93306 TTE W/DOPPLER COMPLETE: CPT

## 2023-05-16 PROCEDURE — 80061 LIPID PANEL: CPT | Performed by: INTERNAL MEDICINE

## 2023-05-16 PROCEDURE — 36415 COLL VENOUS BLD VENIPUNCTURE: CPT | Performed by: INTERNAL MEDICINE

## 2023-05-18 DIAGNOSIS — I48.20 CHRONIC ATRIAL FIBRILLATION (H): ICD-10-CM

## 2023-05-19 DIAGNOSIS — I25.10 CORONARY ARTERY DISEASE INVOLVING NATIVE CORONARY ARTERY OF NATIVE HEART WITHOUT ANGINA PECTORIS: ICD-10-CM

## 2023-05-19 DIAGNOSIS — E78.00 HYPERCHOLESTEROLEMIA: ICD-10-CM

## 2023-05-19 RX ORDER — EZETIMIBE 10 MG/1
10 TABLET ORAL DAILY
Qty: 90 TABLET | Refills: 0 | Status: SHIPPED | OUTPATIENT
Start: 2023-05-19 | End: 2023-06-09

## 2023-05-19 RX ORDER — ROSUVASTATIN CALCIUM 40 MG/1
40 TABLET, COATED ORAL DAILY
Qty: 90 TABLET | Refills: 0 | Status: SHIPPED | OUTPATIENT
Start: 2023-05-19 | End: 2023-06-09

## 2023-06-09 ENCOUNTER — OFFICE VISIT (OUTPATIENT)
Dept: CARDIOLOGY | Facility: CLINIC | Age: 68
End: 2023-06-09
Payer: COMMERCIAL

## 2023-06-09 ENCOUNTER — TELEPHONE (OUTPATIENT)
Dept: CARDIOLOGY | Facility: CLINIC | Age: 68
End: 2023-06-09

## 2023-06-09 DIAGNOSIS — I25.10 CORONARY ARTERY DISEASE INVOLVING NATIVE CORONARY ARTERY OF NATIVE HEART WITHOUT ANGINA PECTORIS: Primary | ICD-10-CM

## 2023-06-09 DIAGNOSIS — I77.810 DILATED AORTIC ROOT (H): ICD-10-CM

## 2023-06-09 DIAGNOSIS — E78.00 HYPERCHOLESTEROLEMIA: ICD-10-CM

## 2023-06-09 DIAGNOSIS — I48.21 PERMANENT ATRIAL FIBRILLATION (H): ICD-10-CM

## 2023-06-09 DIAGNOSIS — I48.20 CHRONIC ATRIAL FIBRILLATION (H): ICD-10-CM

## 2023-06-09 DIAGNOSIS — I77.810 ASCENDING AORTA DILATATION (H): ICD-10-CM

## 2023-06-09 PROBLEM — I48.0 PAROXYSMAL ATRIAL FIBRILLATION (H): Status: ACTIVE | Noted: 2022-03-15

## 2023-06-09 PROCEDURE — 99215 OFFICE O/P EST HI 40 MIN: CPT | Performed by: INTERNAL MEDICINE

## 2023-06-09 RX ORDER — NIACINAMIDE 500 MG
500 TABLET ORAL DAILY
COMMUNITY

## 2023-06-09 RX ORDER — ROSUVASTATIN CALCIUM 40 MG/1
40 TABLET, COATED ORAL DAILY
Qty: 90 TABLET | Refills: 3
Start: 2023-06-09 | End: 2023-08-17

## 2023-06-09 RX ORDER — NITROGLYCERIN 0.4 MG/1
0.4 TABLET SUBLINGUAL EVERY 5 MIN PRN
Qty: 25 TABLET | Refills: 3
Start: 2023-06-09 | End: 2024-02-22

## 2023-06-09 RX ORDER — EZETIMIBE 10 MG/1
10 TABLET ORAL DAILY
Qty: 90 TABLET | Refills: 3
Start: 2023-06-09 | End: 2023-08-17

## 2023-06-09 RX ORDER — NEBIVOLOL 5 MG/1
5 TABLET ORAL DAILY
Qty: 90 TABLET | Refills: 4
Start: 2023-06-09 | End: 2023-10-12

## 2023-06-09 NOTE — LETTER
6/9/2023    Mary Donaldson MD  [address]  [place] [address]    RE: Adis Prince       Dear Colleague,     I had the pleasure of seeing Adis Prince in the [facility].  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    He has coronary disease, permanent atrial fibrillation on rivaroxaban, hypercholesterolemia, erectile dysfunction and orthostatic hypotension.    Adis's coronary history dates back to 2002 when he had an inferior wall myocardial infarction with 4 bare metal stents placed in all 3 arteries at [facility].  In 2010, a second inferior wall myocardial infarction led to a drug-eluting stent being placed in his distal right coronary artery. Infarct was tiny.  Adis eventually quit smoking cigarettes which was a major prakash.  He is following a much healthier lifestyle, eating a better diet, watching his weight closely and trying to exercise regularly.       Got picked up his atrial fibrillation on his Apple Watch in the winter 2021.  He had no chest, arm, neck, jaw or shoulder discomfort.  No dyspnea on exertion, orthopnea or PND.  No lightheadedness, dizziness, syncope or near syncope.  No symptoms to suggest a TIA or CVA.     When I saw Adis 2021, he had sold his place in [place] and moved up to [place] and bought a puppy and was leading a much healthier lifestyle.     He does snore at nighttime.  He states he takes 1 nap in the afternoon.  He is not interested in a sleep study.    His main complaint at this time is persistent orthostasis despite stopping losartan.     ASSESSMENT:  Adis appears to be doing well from a cardiac standpoint without clinical evidence of ischemia or heart failure.  We did a follow-up echocardiogram which showed an ejection fraction of 55 to 60%.  No significant valvular pathology.  Rate controlled atrial fibrillation.  Mildly dilated  aortic root at 3.8 and ascending aorta at 4.1.     He is in atrial fibrillation by physical exam. We did a Zio Patch in February 2022 which showed an average heart rate of 89, ranging from .  He had one 6-beat run of V-tach at 154 beats per minute.      Blood pressure is very well controlled at 107/74 with a pulse of 80.  He does have an orthostatic tilt so I will decrease his Bystolic to 5 mg daily.  I have asked him to call me in a month to see how he is doing.  We will need to check a Zio Patch to make sure he has good control of his A-fib.      He occasionally drinks alcohol.     He notes no side effects or problems with his current medical regimen.     Cholesterol is well treated with total cholesterol 113, HDL 41, LDL 57.  Over the last 4 years mostly his LDLs have been 55 or lower.  I will continue his rosuvastatin and Zetia.     Creatinine is 0.91 with normal electrolytes.     He is playing pickleball, and as he is having no symptoms, I do not think I need to run a stress test at this time.     We talked about AFib ablation.  Other possibilities.  We talked about the fact that the rivaroxaban has to be taken with a fatty meal.  He states his most consistent meal is breakfast, so he was going to take it with breakfast in the morning.  I will plan on visiting in 1 year.  If he should have any problems, I would be glad to see him sooner.     Thank you for allowing me to participate in his care.     Christopher Martin MD, [facility]       Today's clinic visit entailed:  Review of the result(s) of each unique test - Echocardiogram, Zio Patch, lab work  Ordering of each unique test  Prescription drug management  40 minutes spent by me on the date of the encounter doing chart review, history and exam, documentation and further activities per the note  Provider  Link to [facility] Help Grid     The level of medical decision making during this visit was of moderate complexity.      No orders of the defined types were  placed in this encounter.      Orders Placed This Encounter   Medications    niacinamide 500 MG tablet     Sig: Take 500 mg by mouth 2 times daily (with meals)    ezetimibe (ZETIA) 10 MG tablet     Sig: Take 1 tablet (10 mg) by mouth daily     Dispense:  90 tablet     Refill:  3    nebivolol (BYSTOLIC) 5 MG tablet     Sig: Take 1 tablet (5 mg) by mouth daily     Dispense:  90 tablet     Refill:  4    nitroGLYcerin (NITROSTAT) 0.4 MG sublingual tablet     Sig: Place 1 tablet (0.4 mg) under the tongue every 5 minutes as needed for chest pain     Dispense:  25 tablet     Refill:  3    rivaroxaban ANTICOAGULANT (XARELTO ANTICOAGULANT) 20 MG TABS tablet     Sig: Take 1 tablet (20 mg) by mouth daily (with dinner)     Dispense:  90 tablet     Refill:  3    rosuvastatin (CRESTOR) 40 MG tablet     Sig: Take 1 tablet (40 mg) by mouth daily     Dispense:  90 tablet     Refill:  3       Medications Discontinued During This Encounter   Medication Reason    losartan (COZAAR) 25 MG tablet Med Rec(No AVS / No eCancel)    nebivolol (BYSTOLIC) 10 MG tablet Reorder (No AVS / No eCancel)    nitroGLYcerin (NITROSTAT) 0.4 MG sublingual tablet Reorder (No AVS / No eCancel)    rivaroxaban ANTICOAGULANT (XARELTO ANTICOAGULANT) 20 MG TABS tablet Reorder (No AVS / No eCancel)    rosuvastatin (CRESTOR) 40 MG tablet Reorder (No AVS / No eCancel)    ezetimibe (ZETIA) 10 MG tablet Reorder (No AVS / No eCancel)    aspirin 81 MG tablet          Encounter Diagnoses   Name Primary?    Coronary artery disease involving native coronary artery of native heart without angina pectoris Yes    Chronic atrial fibrillation (H)     Hypercholesterolemia     Permanent atrial fibrillation (H)        CURRENT MEDICATIONS:  Current Outpatient Medications   Medication Sig Dispense Refill    ezetimibe (ZETIA) 10 MG tablet Take 1 tablet (10 mg) by mouth daily 90 tablet 3    nebivolol (BYSTOLIC) 5 MG tablet Take 1 tablet (5 mg) by mouth daily 90 tablet 4    niacinamide  500 MG tablet Take 500 mg by mouth 2 times daily (with meals)      nitroGLYcerin (NITROSTAT) 0.4 MG sublingual tablet Place 1 tablet (0.4 mg) under the tongue every 5 minutes as needed for chest pain 25 tablet 3    Omega-3 Fatty Acids (FISH OIL) 500 MG CAPS Take 1,500 mg by mouth daily      rivaroxaban ANTICOAGULANT (XARELTO ANTICOAGULANT) 20 MG TABS tablet Take 1 tablet (20 mg) by mouth daily (with dinner) 90 tablet 3    rosuvastatin (CRESTOR) 40 MG tablet Take 1 tablet (40 mg) by mouth daily 90 tablet 3    tadalafil (CIALIS) 20 MG tablet Take 1 tablet (20 mg) by mouth as needed (as desired) (Patient not taking: Reported on 5/24/2022) 14 tablet 3       ALLERGIES   No Known Allergies    PAST MEDICAL HISTORY:  Past Medical History:   Diagnosis Date    Benign essential hypertension     CAD (coronary artery disease)     cardiac cath 2010: ALEJANDRA to RCA; cardiac cath 2002: BMS to RCA, mid LAD, mid circumflex    Chronic atrial fibrillation (H)     Hypercholesterolemia     Old myocardial infarction     inferior MI 2010, inferior MI 2002       PAST SURGICAL HISTORY:  Past Surgical History:   Procedure Laterality Date    HEART CATH CORONARY ANGIOGRAM W/LV GRAM  1/2002    @ [facility] Bare Metal stents X 4,  distal RCA, mid :AD and mid Circumflex    HEART CATH CORONARY ANGIOGRAM W/LV GRAM  2/2010    2/10 Mid RCA thrombotic 90 % lesion stented       FAMILY HISTORY:  Family History   Problem Relation Age of Onset    Heart Disease Father         MI at 58       SOCIAL HISTORY:  Social History     Socioeconomic History    Marital status:      Spouse name: None    Number of children: None    Years of education: None    Highest education level: None   Tobacco Use    Smoking status: Former     Types: Cigars    Smokeless tobacco: Never    Tobacco comments:     3-4 per year   Substance and Sexual Activity    Alcohol use: Yes     Alcohol/week: 0.0 standard drinks of alcohol     Comment: 1-2 times per week    Drug use: No   Other Topics  Concern    Caffeine Concern No     Comment: 2-3 cups per day    Sleep Concern No    Stress Concern No    Special Diet Yes     Comment: low fat, high fiber    Back Care No    Exercise No    Parent/sibling w/ CABG, MI or angioplasty before 65F 55M? No       Review of Systems:  Skin:  Negative       Eyes:  Negative      ENT:  Negative      Respiratory:  Negative       Cardiovascular:  Negative;palpitations;chest pain;syncope or near-syncope;cyanosis;fatigue Positive for;lightheadedness;dizziness    Gastroenterology: Negative      Genitourinary:  Negative      Musculoskeletal:  Negative      Neurologic:  Negative      Psychiatric:  Negative      Heme/Lymph/Imm:  Negative      Endocrine:  Negative        Physical Exam:  Vitals: There were no vitals taken for this visit.    Constitutional:  cooperative, alert and oriented, well developed, well nourished, in no acute distress        Skin:  warm and dry to the touch, no apparent skin lesions or masses noted          Head:  normocephalic, no masses or lesions        Eyes:  pupils equal and round, conjunctivae and lids unremarkable, sclera white, no xanthalasma, EOMS intact, no nystagmus        Lymph:      ENT:  no pallor or cyanosis, dentition good        Neck:  carotid pulses are full and equal bilaterally;no carotid bruit        Respiratory:  normal breath sounds, clear to auscultation, normal A-P diameter, normal symmetry, normal respiratory excursion, no use of accessory muscles         Cardiac: no murmurs, gallops or rubs detected;normal S1 and S2 irregularly irregular rhythm           Rate controlled  pulses full and equal                                        GI:           Extremities and Muscular Skeletal:  no edema;no spinal abnormalities noted;normal muscle strength and tone              Neurological:  no gross motor deficits        Psych:  affect appropriate, oriented to time, person and place        CC  No referring provider defined for this  encounter.      Thank you for allowing me to participate in the care of your patient.      Sincerely,     Christopher Martin MD     [facility]

## 2023-06-09 NOTE — TELEPHONE ENCOUNTER
Message from Dr. Martin:  Christopher Martin MD  P [facility] Heart Team 2  Please tell patient he does not need to be on aspirin in addition to her rivaroxaban.  He can stop his aspirin.       1317 spoke with patient to review Dr. Martin's recommendation to stop ASA 81mg daily. Patient verbalized understanding and agreed with plan.

## 2023-06-09 NOTE — PROGRESS NOTES
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    He has coronary disease, permanent atrial fibrillation on rivaroxaban, hypercholesterolemia, erectile dysfunction and orthostatic hypotension.    Adis's coronary history dates back to 2002 when he had an inferior wall myocardial infarction with 4 bare metal stents placed in all 3 arteries at [facility].  In 2010, a second inferior wall myocardial infarction led to a drug-eluting stent being placed in his distal right coronary artery. Infarct was tiny.  Adis eventually quit smoking cigarettes which was a major prakash.  He is following a much healthier lifestyle, eating a better diet, watching his weight closely and trying to exercise regularly.       Got picked up his atrial fibrillation on his Apple Watch in the winter 2021.  He had no chest, arm, neck, jaw or shoulder discomfort.  No dyspnea on exertion, orthopnea or PND.  No lightheadedness, dizziness, syncope or near syncope.  No symptoms to suggest a TIA or CVA.     When I saw Adis 2021, he had sold his place in [place] and moved up to [place] and bought a puppy and was leading a much healthier lifestyle.     He does snore at nighttime.  He states he takes 1 nap in the afternoon.  He is not interested in a sleep study.    His main complaint at this time is persistent orthostasis despite stopping losartan.     ASSESSMENT:  Adis appears to be doing well from a cardiac standpoint without clinical evidence of ischemia or heart failure.  We did a follow-up echocardiogram which showed an ejection fraction of 55 to 60%.  No significant valvular pathology.  Rate controlled atrial fibrillation.  Mildly dilated aortic root at 3.8 and ascending aorta at 4.1.     He is in atrial fibrillation by physical exam. We did a Zio Patch in February 2022 which showed an average heart rate of 89, ranging from .  He had one 6-beat  run of V-tach at 154 beats per minute.      Blood pressure is very well controlled at 107/74 with a pulse of 80.  He does have an orthostatic tilt so I will decrease his Bystolic to 5 mg daily.  I have asked him to call me in a month to see how he is doing.  We will need to check a Zio Patch to make sure he has good control of his A-fib.      He occasionally drinks alcohol.     He notes no side effects or problems with his current medical regimen.     Cholesterol is well treated with total cholesterol 113, HDL 41, LDL 57.  Over the last 4 years mostly his LDLs have been 55 or lower.  I will continue his rosuvastatin and Zetia.     Creatinine is 0.91 with normal electrolytes.     He is playing pickleball, and as he is having no symptoms, I do not think I need to run a stress test at this time.     We talked about AFib ablation.  Other possibilities.  We talked about the fact that the rivaroxaban has to be taken with a fatty meal.  He states his most consistent meal is breakfast, so he was going to take it with breakfast in the morning.  I will plan on visiting in 1 year.  If he should have any problems, I would be glad to see him sooner.     Thank you for allowing me to participate in his care.     Christopher Martin MD, [facility]       Today's clinic visit entailed:  Review of the result(s) of each unique test - Echocardiogram, Zio Patch, lab work  Ordering of each unique test  Prescription drug management  40 minutes spent by me on the date of the encounter doing chart review, history and exam, documentation and further activities per the note  Provider  Link to [facility] Help Grid     The level of medical decision making during this visit was of moderate complexity.      No orders of the defined types were placed in this encounter.      Orders Placed This Encounter   Medications     niacinamide 500 MG tablet     Sig: Take 500 mg by mouth 2 times daily (with meals)     ezetimibe (ZETIA) 10 MG tablet     Sig: Take 1 tablet  (10 mg) by mouth daily     Dispense:  90 tablet     Refill:  3     nebivolol (BYSTOLIC) 5 MG tablet     Sig: Take 1 tablet (5 mg) by mouth daily     Dispense:  90 tablet     Refill:  4     nitroGLYcerin (NITROSTAT) 0.4 MG sublingual tablet     Sig: Place 1 tablet (0.4 mg) under the tongue every 5 minutes as needed for chest pain     Dispense:  25 tablet     Refill:  3     rivaroxaban ANTICOAGULANT (XARELTO ANTICOAGULANT) 20 MG TABS tablet     Sig: Take 1 tablet (20 mg) by mouth daily (with dinner)     Dispense:  90 tablet     Refill:  3     rosuvastatin (CRESTOR) 40 MG tablet     Sig: Take 1 tablet (40 mg) by mouth daily     Dispense:  90 tablet     Refill:  3       Medications Discontinued During This Encounter   Medication Reason     losartan (COZAAR) 25 MG tablet Med Rec(No AVS / No eCancel)     nebivolol (BYSTOLIC) 10 MG tablet Reorder (No AVS / No eCancel)     nitroGLYcerin (NITROSTAT) 0.4 MG sublingual tablet Reorder (No AVS / No eCancel)     rivaroxaban ANTICOAGULANT (XARELTO ANTICOAGULANT) 20 MG TABS tablet Reorder (No AVS / No eCancel)     rosuvastatin (CRESTOR) 40 MG tablet Reorder (No AVS / No eCancel)     ezetimibe (ZETIA) 10 MG tablet Reorder (No AVS / No eCancel)     aspirin 81 MG tablet          Encounter Diagnoses   Name Primary?     Coronary artery disease involving native coronary artery of native heart without angina pectoris Yes     Chronic atrial fibrillation (H)      Hypercholesterolemia      Permanent atrial fibrillation (H)        CURRENT MEDICATIONS:  Current Outpatient Medications   Medication Sig Dispense Refill     ezetimibe (ZETIA) 10 MG tablet Take 1 tablet (10 mg) by mouth daily 90 tablet 3     nebivolol (BYSTOLIC) 5 MG tablet Take 1 tablet (5 mg) by mouth daily 90 tablet 4     niacinamide 500 MG tablet Take 500 mg by mouth 2 times daily (with meals)       nitroGLYcerin (NITROSTAT) 0.4 MG sublingual tablet Place 1 tablet (0.4 mg) under the tongue every 5 minutes as needed for chest  pain 25 tablet 3     Omega-3 Fatty Acids (FISH OIL) 500 MG CAPS Take 1,500 mg by mouth daily       rivaroxaban ANTICOAGULANT (XARELTO ANTICOAGULANT) 20 MG TABS tablet Take 1 tablet (20 mg) by mouth daily (with dinner) 90 tablet 3     rosuvastatin (CRESTOR) 40 MG tablet Take 1 tablet (40 mg) by mouth daily 90 tablet 3     tadalafil (CIALIS) 20 MG tablet Take 1 tablet (20 mg) by mouth as needed (as desired) (Patient not taking: Reported on 5/24/2022) 14 tablet 3       ALLERGIES   No Known Allergies    PAST MEDICAL HISTORY:  Past Medical History:   Diagnosis Date     Benign essential hypertension      CAD (coronary artery disease)     cardiac cath 2010: ALEJANDRA to RCA; cardiac cath 2002: BMS to RCA, mid LAD, mid circumflex     Chronic atrial fibrillation (H)      Hypercholesterolemia      Old myocardial infarction     inferior MI 2010, inferior MI 2002       PAST SURGICAL HISTORY:  Past Surgical History:   Procedure Laterality Date     HEART CATH CORONARY ANGIOGRAM W/LV GRAM  1/2002    @ [facility] Bare Metal stents X 4,  distal RCA, mid :AD and mid Circumflex     HEART CATH CORONARY ANGIOGRAM W/LV GRAM  2/2010    2/10 Mid RCA thrombotic 90 % lesion stented       FAMILY HISTORY:  Family History   Problem Relation Age of Onset     Heart Disease Father         MI at 58       SOCIAL HISTORY:  Social History     Socioeconomic History     Marital status:      Spouse name: None     Number of children: None     Years of education: None     Highest education level: None   Tobacco Use     Smoking status: Former     Types: Cigars     Smokeless tobacco: Never     Tobacco comments:     3-4 per year   Substance and Sexual Activity     Alcohol use: Yes     Alcohol/week: 0.0 standard drinks of alcohol     Comment: 1-2 times per week     Drug use: No   Other Topics Concern     Caffeine Concern No     Comment: 2-3 cups per day     Sleep Concern No     Stress Concern No     Special Diet Yes     Comment: low fat, high fiber     Back Care  No     Exercise No     Parent/sibling w/ CABG, MI or angioplasty before 65F 55M? No       Review of Systems:  Skin:  Negative       Eyes:  Negative      ENT:  Negative      Respiratory:  Negative       Cardiovascular:  Negative;palpitations;chest pain;syncope or near-syncope;cyanosis;fatigue Positive for;lightheadedness;dizziness    Gastroenterology: Negative      Genitourinary:  Negative      Musculoskeletal:  Negative      Neurologic:  Negative      Psychiatric:  Negative      Heme/Lymph/Imm:  Negative      Endocrine:  Negative        Physical Exam:  Vitals: There were no vitals taken for this visit.    Constitutional:  cooperative, alert and oriented, well developed, well nourished, in no acute distress        Skin:  warm and dry to the touch, no apparent skin lesions or masses noted          Head:  normocephalic, no masses or lesions        Eyes:  pupils equal and round, conjunctivae and lids unremarkable, sclera white, no xanthalasma, EOMS intact, no nystagmus        Lymph:      ENT:  no pallor or cyanosis, dentition good        Neck:  carotid pulses are full and equal bilaterally;no carotid bruit        Respiratory:  normal breath sounds, clear to auscultation, normal A-P diameter, normal symmetry, normal respiratory excursion, no use of accessory muscles         Cardiac: no murmurs, gallops or rubs detected;normal S1 and S2 irregularly irregular rhythm           Rate controlled  pulses full and equal                                        GI:           Extremities and Muscular Skeletal:  no edema;no spinal abnormalities noted;normal muscle strength and tone              Neurological:  no gross motor deficits        Psych:  affect appropriate, oriented to time, person and place        CC  No referring provider defined for this encounter.

## 2023-08-17 DIAGNOSIS — E78.00 HYPERCHOLESTEROLEMIA: ICD-10-CM

## 2023-08-17 DIAGNOSIS — I25.10 CORONARY ARTERY DISEASE INVOLVING NATIVE CORONARY ARTERY OF NATIVE HEART WITHOUT ANGINA PECTORIS: ICD-10-CM

## 2023-08-17 RX ORDER — EZETIMIBE 10 MG/1
10 TABLET ORAL DAILY
Qty: 90 TABLET | Refills: 3 | Status: SHIPPED | OUTPATIENT
Start: 2023-08-17 | End: 2024-08-05

## 2023-08-17 RX ORDER — ROSUVASTATIN CALCIUM 40 MG/1
40 TABLET, COATED ORAL DAILY
Qty: 90 TABLET | Refills: 3 | Status: SHIPPED | OUTPATIENT
Start: 2023-08-17 | End: 2024-08-05

## 2023-10-12 ENCOUNTER — TELEPHONE (OUTPATIENT)
Dept: CARDIOLOGY | Facility: CLINIC | Age: 68
End: 2023-10-12
Payer: COMMERCIAL

## 2023-10-12 DIAGNOSIS — I48.20 CHRONIC ATRIAL FIBRILLATION (H): ICD-10-CM

## 2023-10-12 DIAGNOSIS — I25.10 CORONARY ARTERY DISEASE INVOLVING NATIVE CORONARY ARTERY OF NATIVE HEART WITHOUT ANGINA PECTORIS: Primary | ICD-10-CM

## 2023-10-12 RX ORDER — NEBIVOLOL 5 MG/1
5 TABLET ORAL DAILY
Qty: 90 TABLET | Refills: 3 | Status: SHIPPED | OUTPATIENT
Start: 2023-10-12 | End: 2023-10-12

## 2023-10-12 RX ORDER — NEBIVOLOL 5 MG/1
5 TABLET ORAL DAILY
Qty: 7 TABLET | Refills: 3 | Status: SHIPPED | OUTPATIENT
Start: 2023-10-12 | End: 2023-10-16

## 2023-10-12 NOTE — TELEPHONE ENCOUNTER
I called Adis when I got his message that he was already out of hs medications. I did order them from [facility] but I offered to have some sent to a local pharmacy until he gets his shipment. He wants them sent to the [facility] in [place], [place].     [facility] Cardiology Refill Guideline reviewed.  Medication meets criteria for refill.

## 2023-10-12 NOTE — TELEPHONE ENCOUNTER
[facility] Call Center    Phone Message    May a detailed message be left on voicemail: yes     Reason for Call: Medication Refill Request    Has the patient contacted the pharmacy for the refill? Yes   Name of medication being requested: nebivolol (BYSTOLIC) 5 MG tablet  rivaroxaban ANTICOAGULANT (XARELTO ANTICOAGULANT) 20 MG TABS tablet  Provider who prescribed the medication: Dr. Martin  Pharmacy:    [facility] - ONE [facility] AT PORTAL TO [facility] SITES    Date medication is needed: ASAP   Patient stated pharmacy has tried three times to obtain refill requests orders, but have not been successful. Patient is out of medications and needing refill orders to be sent to pharmacy as soon as possible. Please review and send orders as needed. Thank you!    Action Taken: Other: Cardiology    Travel Screening: Not Applicable    Thank you!  [facility]                                                                      
See separate encounter for refills.   
negative - no change in level of consciousness

## 2023-10-16 ENCOUNTER — TELEPHONE (OUTPATIENT)
Dept: CARDIOLOGY | Facility: CLINIC | Age: 68
End: 2023-10-16
Payer: COMMERCIAL

## 2023-10-16 DIAGNOSIS — I25.10 CORONARY ARTERY DISEASE INVOLVING NATIVE CORONARY ARTERY OF NATIVE HEART WITHOUT ANGINA PECTORIS: ICD-10-CM

## 2023-10-16 DIAGNOSIS — I48.20 CHRONIC ATRIAL FIBRILLATION (H): ICD-10-CM

## 2023-10-16 RX ORDER — NEBIVOLOL 5 MG/1
5 TABLET ORAL DAILY
Qty: 90 TABLET | Refills: 2 | Status: SHIPPED | OUTPATIENT
Start: 2023-10-16 | End: 2023-10-16

## 2023-10-16 RX ORDER — NEBIVOLOL 5 MG/1
5 TABLET ORAL DAILY
Qty: 7 TABLET | Refills: 0 | Status: SHIPPED | OUTPATIENT
Start: 2023-10-16 | End: 2024-06-04

## 2023-10-16 NOTE — TELEPHONE ENCOUNTER
I called Adis per his request to tell him I re-ordered his Bystolic and Xarelto in [place] so he can pick them up today. I told him, I had just [facility]ordered it this [facility], thinking he had picked up his 1 week supply last week. (He was out!) he said he just got too busy with work. He said he will pick it up today for sure.

## 2023-10-16 NOTE — TELEPHONE ENCOUNTER
[facility] Call Center    Phone Message    May a detailed message be left on voicemail: yes     Reason for Call: Medication Refill Request    Has the patient contacted the pharmacy for the refill? Yes   Name of medication being requested: rivaroxaban ANTICOAGULANT (XARELTO ANTICOAGULANT) 20 MG TABS tablet  nebivolol (BYSTOLIC) 5 MG tablet  Provider who prescribed the medication: Dr. Martin  Pharmacy:   [address]    Date medication is needed: 10/16/23   Patient states that  he was supposed to have a one week supply of these sent to this pharmacy. He went to pick this up and the pharmacy states the the rx was cancelled. Please re-send short order supply today and call patient when this is complete.     Action Taken: Other: cardiology    Travel Screening: Not Applicable  Thank you!  [facility]

## 2023-10-20 ENCOUNTER — TELEPHONE (OUTPATIENT)
Dept: CARDIOLOGY | Facility: CLINIC | Age: 68
End: 2023-10-20
Payer: COMMERCIAL

## 2023-10-20 DIAGNOSIS — I48.20 CHRONIC ATRIAL FIBRILLATION (H): ICD-10-CM

## 2023-10-20 NOTE — TELEPHONE ENCOUNTER
[facility] Call Center    Phone Message    May a detailed message be left on voicemail: yes     Reason for Call: Medication Question or concern regarding medication   Prescription Clarification  Name of Medication: Xarelto   Prescribing Provider: Veronica   Pharmacy:      [facility] - ONE [facility] AT PORTAL TO REGISTERED [facility] SITES What on the order needs clarification? On 10/16 it was extended but than denied by Dr Mosqueda's office. Patient would like it filled as he will be out of medications on 10/22/23. Patient believes is might have been denied as he needed a short term refill.       Action Taken: Other: cardio    Travel Screening: Not Applicable

## 2023-10-20 NOTE — TELEPHONE ENCOUNTER
Left a message for patient to call back and discuss. Insurance will likely not process multiple prescription requests at the same time.     Addendum 1145: Spoke to patient and he also thinks it is an insurance issue. Will try sending 14 tabs to [place] pharmacy today, and then will send long-term refills to [facility] on Monday after short term refill has been filled. Patient will call back if this does not work.       10/23/2023 Xarelto refill for 90 days and 3 refills sent to [facility].

## 2023-11-26 ENCOUNTER — HEALTH MAINTENANCE LETTER (OUTPATIENT)
Age: 68
End: 2023-11-26

## 2024-02-22 DIAGNOSIS — I25.10 CORONARY ARTERY DISEASE INVOLVING NATIVE CORONARY ARTERY OF NATIVE HEART WITHOUT ANGINA PECTORIS: ICD-10-CM

## 2024-02-22 RX ORDER — NITROGLYCERIN 0.4 MG/1
0.4 TABLET SUBLINGUAL EVERY 5 MIN PRN
Qty: 25 TABLET | Refills: 1 | Status: SHIPPED | OUTPATIENT
Start: 2024-02-22 | End: 2024-02-26

## 2024-02-23 ENCOUNTER — TELEPHONE (OUTPATIENT)
Dept: CARDIOLOGY | Facility: CLINIC | Age: 69
End: 2024-02-23
Payer: COMMERCIAL

## 2024-02-26 ENCOUNTER — TELEPHONE (OUTPATIENT)
Dept: CARDIOLOGY | Facility: CLINIC | Age: 69
End: 2024-02-26
Payer: COMMERCIAL

## 2024-02-26 DIAGNOSIS — I25.10 CORONARY ARTERY DISEASE INVOLVING NATIVE CORONARY ARTERY OF NATIVE HEART WITHOUT ANGINA PECTORIS: ICD-10-CM

## 2024-02-26 RX ORDER — NITROGLYCERIN 0.4 MG/1
0.4 TABLET SUBLINGUAL EVERY 5 MIN PRN
Qty: 25 TABLET | Refills: 1 | Status: SHIPPED | OUTPATIENT
Start: 2024-02-26

## 2024-02-26 NOTE — TELEPHONE ENCOUNTER
"Received fax from [facility] pharmacy requesting further direction details for the NTG refill sent last week. Refill updated to add \"after 3 doses with no relief call 911\".  "

## 2024-04-02 ENCOUNTER — ALLIED HEALTH/NURSE VISIT (OUTPATIENT)
Dept: RESEARCH | Facility: CLINIC | Age: 69
End: 2024-04-02
Payer: COMMERCIAL

## 2024-04-02 VITALS
WEIGHT: 218 LBS | BODY MASS INDEX: 28.89 KG/M2 | SYSTOLIC BLOOD PRESSURE: 147 MMHG | TEMPERATURE: 98.1 F | DIASTOLIC BLOOD PRESSURE: 105 MMHG | OXYGEN SATURATION: 96 % | RESPIRATION RATE: 14 BRPM | HEIGHT: 73 IN | HEART RATE: 89 BPM

## 2024-04-02 DIAGNOSIS — Z00.6 RESEARCH SUBJECT: Primary | ICD-10-CM

## 2024-04-02 LAB
ATRIAL RATE - MUSE: 156 BPM
ATRIAL RATE - MUSE: 77 BPM
DIASTOLIC BLOOD PRESSURE - MUSE: NORMAL MMHG
DIASTOLIC BLOOD PRESSURE - MUSE: NORMAL MMHG
INTERPRETATION ECG - MUSE: NORMAL
INTERPRETATION ECG - MUSE: NORMAL
P AXIS - MUSE: NORMAL DEGREES
P AXIS - MUSE: NORMAL DEGREES
PR INTERVAL - MUSE: NORMAL MS
PR INTERVAL - MUSE: NORMAL MS
QRS DURATION - MUSE: 86 MS
QRS DURATION - MUSE: 90 MS
QT - MUSE: 380 MS
QT - MUSE: 394 MS
QTC - MUSE: 452 MS
QTC - MUSE: 474 MS
R AXIS - MUSE: -15 DEGREES
R AXIS - MUSE: -31 DEGREES
SYSTOLIC BLOOD PRESSURE - MUSE: NORMAL MMHG
SYSTOLIC BLOOD PRESSURE - MUSE: NORMAL MMHG
T AXIS - MUSE: -5 DEGREES
T AXIS - MUSE: 12 DEGREES
VENTRICULAR RATE- MUSE: 85 BPM
VENTRICULAR RATE- MUSE: 87 BPM

## 2024-04-02 PROCEDURE — 93010 ELECTROCARDIOGRAM REPORT: CPT | Performed by: INTERNAL MEDICINE

## 2024-04-02 PROCEDURE — 93005 ELECTROCARDIOGRAM TRACING: CPT

## 2024-04-02 PROCEDURE — 99207 PR NO CHARGE NURSE ONLY: CPT

## 2024-04-02 NOTE — PROGRESS NOTES
[facility] Inclusion/Exclusion Criteria:    Study Name: [facility]   : Jevon Ramon MD      Study Description: The [employer]op ECG feature is a software-only mobile medical application intended for use with the Whoop strap to create, record, store, transfer and display a single-channel electrocardiogram (ECG) qualitatively similar to a lead I ECG.     Protocol Version: 2.0 ([id number])  Consent Version: 1.0 ( 12 Jan 2024 )    Criteria #  Inclusion Criteria (ALL MUST BE YES)  YES/NO/N/A   1  Aged 22 years or older  Yes   2 Ability to provide informed consent Yes   3 Willing to participate and to follow the procedures per the Principle Investigator's instructions Yes   4  Resides in the [place]    Yes   5   Wrist circumference: 130 mm to 245 mm at band wear position Yes   6    Previous medical history of persistent, permanent, or chronic AF and being in AF at the time of enrollment (AF Cohort Only)    Yes   7     No known history of AF and being in normal sinus rhythm at the time of enrollment (SR Cohort Only) NA             Criteria # Exclusion Criteria (ALL MUST BE NO) YES/NO/N/A   1  Subjects with an implantable pacemaker device or implantable cardioverter-defibrillator device No   2 Medical history of a life-threatening cardiac arrhythmia eg., Ventricular tachycardia or fibrillation No   3  Any known allergies to medical adhesives, isopropyl alcohol, or ECG patch    No   4  Any known allergy or or sensitivity to thermoplastics, metals with PVD coatings or Elastane used in the wrist fitness device  No   5  Clinically significant body tremors that compromise study measurements    No   6  Pregnant at the time of enrollment   NA   7  Any physical disability that prevents safe and adequate testing    No   8  Physical or medical impairments that preclude exercise testing, including, but not limited to, back pain and leg claudication.  No   9  Mental impairment as determined by the Investigator or  designee    No   10  Subjects with any medical history, physical examination finding, vital sign or other finding or assessment that could compromise subject safety, study integrity or accurate that could compromise subject safety, study integrity or accurate assessment of study objectives. This includes, but is not limited to, known untreated medical conditions that may be considered clinically significant, such as significant anemia, electrolyte imbalance, untreated or uncontrolled thyroid disease, and open wound(s) in the areas of test band positioning   No   11    Vital sign measurements, medical history of physical examination finding that makes the subject inappropriate for participation according to the investigator  No   12    Scarring, tattoos, large, pigmented moles or other skin pathology in the area of sensor location  No   13    Severe skin conditions on either wrist, that would preclude wearing the sensor. Severe symptomatic skin injury, disorder, allergy or disease such as eczema, rosacea, impetigo, dermatomyositis, or contact dermatitis on wrists or other areas where sensors or electrodes will be placed  No   14    Clinically significant hand tremors as judged by the Investigator No   15  Participated in Phase 1 of the study (Only for Phase 2 cohort)  NA     Patient does fulfill study inclusion criteria and no exclusion criteria are found.     Jevon Ramon MD    2-APR-2024    Isaac Escalera

## 2024-04-02 NOTE — PROGRESS NOTES
[facility] Study    Study Description: The [employer] ECG feature is a software-only mobile medical application intended for use with the Whoop strap to create, record, store, transfer and display a single-channel electrocardiogram (ECG) qualitatively similar to a lead I ECG.    SCREENING       Demographic Info  Adis Prince   1955          68 year old  male    Woman of Child Bearing Potential?  N/A (Male)   If no, Why? N/A    Race: White/  Ethnicity: Not  or      Heart Abnormalities:  History of Heart Rhythm Abnormalities? Yes Specify Below  AF permanent (Persistent AF plus joint decision to not control rhythm) Onset Year: 2022     Past Medical History:   Diagnosis Date    Benign essential hypertension Onset Date 1/29/2016 , ongoing    CAD (coronary artery disease) Onset date 10/22/2014 , ongoing    Chronic atrial fibrillation (H) Onset Date 3/15/2022, ongoing    Hypercholesterolemia Onset date 2/19/2010         Medication Name Indication Start Date Ongoing? Dose Unit Frequency Route   ezetimibe (ZETIA)  Hypercholesterolemia 17 Aug 2023 Yes 10  mg QD Oral   nebivolol (BYSTOLIC) Benign essential hypertension    16 Nov 2023 Yes 5 mg QD Oral   rivaroxaban ANTICOAGULANT (XARELTO ANTICOAGULANT) Chronic atrial fibrillation (H) 23 Nov 2023 Yes 20 mg QD Oral   rosuvastatin (CRESTOR) Hypercholesterolemia 17 Aug 2023 Yes 40 mg QD Oral       Lifestyle Habits  How often do you...  Exercise? Daily  Consume Caffeine:Daily - Heavy (more than 1 serving a day)  Use Tobacco/Nicotine: WhoopTobacco: Former  Consume Alcohol: Frequently (a few times a week)   Use Recreational Drugs: WhoopDrugs: Frequently (a few times a week)    Time Seated/Time of Assessment: 12:40:00    Dominant Hand: Right  Preferred Band Hand: Left  Reason for Choosing Band Hand: Subject preference    Skin Fold Thickness: 4.2 mm  Band Wrist Circumference: 188 mm  Wrist Hairiness: Thick, low density    Does the subject have tattoos, moles, or  "scars on band wrist? No  Cote Skin Tone: Cote: 2    Vitals Assessment Time 12:50:00  BP (!) 147/105 (BP Location: Left arm, Patient Position: Sitting, Cuff Size: Adult Regular)   Pulse 89   Temp 98.1  F (36.7  C)   Resp 14   Ht 1.854 m (6' 1\")   Wt 98.9 kg (218 lb)   SpO2 96%   BMI 28.76 kg/m      Wait 1 minute between repeat measurements   Heart Rate (bpm) SpO2 (%) Blood Pressure     Assessment Time Result Result  Result    2nd Measurement 12:53:00 84 95 158/106   3rd Measurement 12:56:00 78 96 153/95       Please see Physical Examination note for Screening ECG interpretation.    Physical Exam Time of Assessment is equivalent to ECG time as FELISA is present for the ECG recording and continues their assessment thereafter.     ENROLLMENT     Subject has met all requirements and is Enrolled in the Study? Yes    Enrollment Number: [id number]   [employer] User ID: [id number]     DATA COLLECTION     Device Information  Was the PinguoOP Strap Applied? Yes  [employer] Strap SN#: [id number]  Time of Application: 13:41:00     Subject reviewed EZ2CAD device instructions for use? Yes    Study Mobile Device ID: [id number]  Study Laptop ID: [id number]    Study mobile device and laptop synchronized? Yes    Comparative ECG device applied? Yes    Holter Device ID: [id number]   Time of Application: 13:52:00    Lead Placement QC Performed by: Airam MURILLO    Comparative device recorder date and time set according to local clock? Yes    Practice Strap ECG Taken? Yes  Number of Practice Trials: 1     Data recording for ECG-Resting and ECG-Exercise sections- were directly entered into EDC during study visit.         END OF STUDY     Was the wrist device removed after all the trials? Yes  Was the 12 lead ECG Holter detached after all the trials? Yes    Any Adverse Events? No  Any Protocol Deviations? No    Any changes in medication since screening visit? No  Any device deficiencies? No If yes complete a device deficiency note    Is all " study data for this visit collected? Yes      Date Subject Exited the Study: 2-APR-2024  Time Subject Exited the Study: 14:45:00    Did the subject successfully complete the study? Yes   If no, Why? N/A        2-APR-2024    Isaac Escalera

## 2024-04-02 NOTE — PROGRESS NOTES
Whoop Study Consent    Study Description: The objective of this study is to evaluate the safety and performance of the WHOOP ECG feature in adults (22 years or older) with normal sinus rhythm or diagnosed with AF.      CONSENT     Adis Prince a 68 year old male, was on-site today to discuss participation in the Whoop Study.       The consent form was reviewed with the patient.     The review of the study included:  Study Purpose    Participant Responsibilities    Study Data and Devices    Benefits and Risks of Participation    Compensation and Costs of Participation    Voluntary Participation    Confidentiality    Injury and Legal Rights      Protocol Version: 2.0     Screening Number: [id number] - [id number]    The subject was queried in regards to his willingness to continue and his questions were answered to his satisfaction.   The patient has given his agreement to volunteer and participate in the above noted study.   The eConsent and HIPAA form version 1.0 (Version Date 12 Jan 2024) was signed  on  2-APR-2024 with the Clinical Research Unit of [facility].     A copy of the consent will be placed in subject's medical record. A copy of the consent form was given to the subject today.    Study data is directly entered into Epic and [employer] per protocol.     No study procedures were done prior to Adis Prince providing informed consent.     Has this subject had a previous screen failure in this study? No    If yes, previous Subject Number: NA     Study Phase: Phase 1     2-APR-2024    Isaac Escalera

## 2024-04-02 NOTE — PROGRESS NOTES
"    Study Physical Exam      Medical History Reviewed? Yes    Physical Examination  For abnormal findings, please evaluate if the finding is Clinically Significant (by 'CS') or Not Clinically Significant (by 'NCS')  General Appearance   Normal  Head and Neck   Normal  Eyes     Normal  Ears, Nose Mouth and Throat  Normal  Cardiovascular   Abnormal; NCS irregular rhythm  Respiratory    Normal  Skin and Hair of Wrists  Normal      Physical disability that presents safe or adequate testing: Not present  Dermatological conditions that preclude wearing of sensor or satisfactory data acquisition: Not present  Tremor: Not present  Other: Normal    Vitals:    04/02/24 1250   BP: 147/105   BP Location: Left arm   Patient Position: Sitting   Cuff Size: Adult Regular   Pulse: 89   Resp: 14   Temp: 98.1  F (36.7  C)   SpO2: 96%   Weight: 98.9 kg (218 lb)   Height: 1.854 m (6' 1\")                Electrocardiogram Interpretation:   12 Lead Interpretation: Atrial Fibrillation ; LBBB      02-APR-2024    Cheri Peña PA-C      "

## 2024-04-02 NOTE — PROGRESS NOTES
Study Description: The objective of this study is to evaluate the safety and performance of the WHOOP ECG feature in adults (22 years or older) with normal sinus rhythm or diagnosed with AF.    Post Exercise Assessment: Adis Prince  denies symptoms post exercise.  A 12 lead ECG was completed and reviewed.     Electrocardiogram has returned to baseline and Adis Prince was released from the clinical research unit and completed the study.     02 -APR- 2024    Cheri Peña PA-C

## 2024-06-04 ENCOUNTER — OFFICE VISIT (OUTPATIENT)
Dept: CARDIOLOGY | Facility: CLINIC | Age: 69
End: 2024-06-04
Attending: INTERNAL MEDICINE
Payer: COMMERCIAL

## 2024-06-04 VITALS
WEIGHT: 217.2 LBS | OXYGEN SATURATION: 97 % | SYSTOLIC BLOOD PRESSURE: 118 MMHG | HEART RATE: 72 BPM | BODY MASS INDEX: 28.79 KG/M2 | HEIGHT: 73 IN | DIASTOLIC BLOOD PRESSURE: 84 MMHG

## 2024-06-04 DIAGNOSIS — I48.21 PERMANENT ATRIAL FIBRILLATION (H): Primary | ICD-10-CM

## 2024-06-04 DIAGNOSIS — I77.810 ASCENDING AORTA DILATATION (H): ICD-10-CM

## 2024-06-04 DIAGNOSIS — I10 ESSENTIAL HYPERTENSION, BENIGN: ICD-10-CM

## 2024-06-04 DIAGNOSIS — I77.810 DILATED AORTIC ROOT (H): ICD-10-CM

## 2024-06-04 DIAGNOSIS — I95.1 ORTHOSTASIS: ICD-10-CM

## 2024-06-04 DIAGNOSIS — Z79.01 CHRONIC ANTICOAGULATION: ICD-10-CM

## 2024-06-04 DIAGNOSIS — I25.10 CORONARY ARTERY DISEASE INVOLVING NATIVE CORONARY ARTERY OF NATIVE HEART WITHOUT ANGINA PECTORIS: ICD-10-CM

## 2024-06-04 DIAGNOSIS — E78.00 HYPERCHOLESTEROLEMIA: ICD-10-CM

## 2024-06-04 PROCEDURE — 99214 OFFICE O/P EST MOD 30 MIN: CPT | Performed by: INTERNAL MEDICINE

## 2024-06-04 RX ORDER — METOPROLOL SUCCINATE 25 MG/1
25 TABLET, EXTENDED RELEASE ORAL DAILY
Qty: 90 TABLET | Refills: 4 | Status: SHIPPED | OUTPATIENT
Start: 2024-06-04

## 2024-06-04 NOTE — LETTER
6/4/2024    Mary Donaldson MD  [address]  [place] [address]    RE: Adis Prince       Dear Colleague,     I had the pleasure of seeing Adis Prince in the [facility].  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    He has coronary disease, permanent atrial fibrillation on rivaroxaban, hypercholesterolemia, erectile dysfunction and orthostatic hypotension.     Adis's coronary history dates back to 2002 when he had an inferior wall myocardial infarction with 4 bare metal stents placed in all 3 arteries at [facility].  In 2010, a second inferior wall myocardial infarction led to a drug-eluting stent being placed in his distal right coronary artery. Infarct was tiny.  Adis eventually quit smoking cigarettes which was a major prakash.  He is following a much healthier lifestyle, eating a better diet, watching his weight  and trying to exercise regularly.       He picked up his atrial fibrillation on his Apple Watch in the winter 2021.  He had no chest, arm, neck, jaw or shoulder discomfort.  No dyspnea on exertion, orthopnea or PND.  No lightheadedness, dizziness, syncope or near syncope.  No symptoms to suggest a TIA or CVA.  No spontaneous bleeding problems on his Xarelto      2021, Adis sold his place in [place] and moved up to his cabin in [place] and bought a puppy and was leading a much healthier lifestyle.  He works remotely and comes down to the [facility] about twice a week.     He does snore at nighttime.  He states he takes 1 nap in the afternoon.  He is not interested in a sleep study.     His main complaint at this time is persistent orthostasis despite stopping losartan and cutting his Bystolic in half.     ASSESSMENT AND PLAN  Adis appears to be doing well from a cardiac standpoint without clinical evidence of ischemia or heart failure.  2023 echocardiogram  showed an ejection fraction of 55 to 60%.  No significant valvular pathology.  Rate controlled atrial fibrillation.  Mildly dilated aortic root at 3.8 and ascending aorta at 4.1.     He is in atrial fibrillation by physical exam. We did a Zio Patch in February 2022 which showed an average heart rate of 89, ranging from .  He had one 6-beat run of V-tach at 154 beats per minute.       Blood pressure is very well controlled at 118/84 with a pulse of 72.  He states his orthostasis is a daily problem.  He is only on Bystolic 5.  I have recommended we try changing metoprolol which should help with rate control but less of a blood pressure affect.  If he tolerates this then I will place a 3-day Zio patch to see what her average heart rate is.  If he does not tolerate it I will just check a Zio patch anyways to see how good rate control is.     He occasionally drinks alcohol.    In talking to him about his exercise regiment and diet he is not doing any resistance activity and I told him he should add this in twice a week.  Otherwise have congratulated him on his healthy lifestyle.     He notes no side effects or problems with his current medical regimen.     Fasting lipid profile through [facility] shows a little bit of a backslide with LDL of 60.  With the trend that steadily upward over the last 3 to 4 years.  We talked about the effect of aging and the fact that he may need to get tighter with his diet and has stated adding resistance activity.     Creatinine is 0.88 with normal electrolytes.  Glucose is 116 unclear if this was fasting or not.     He is playing pickleball, and as he is having no symptoms, I do not think I need to run a stress test at this time.     As stated he will call us after he has been on the metoprolol for a couple weeks to see how he is doing.  After that I will set up a Zio patch.  I will follow-up on that as appropriate..  I will plan on visiting in 1 year.  If he should have any problems,  I would be glad to see him sooner.     Thank you for allowing me to participate in his care.     Christopher Martin MD, [facility]    The longitudinal plan of care for the diagnosis(es)/condition(s) as documented were addressed during this visit. Due to the added complexity in care, I will continue to support Adis in the subsequent management and with ongoing continuity of care.      Today's clinic visit entailed:  Review of the result(s) of each unique test - echocardiogram, lab work  Ordering of each unique test  Prescription drug management  35 minutes spent by me on the date of the encounter doing chart review, history and exam, documentation and further activities per the note  Provider  Link to [facility] Help Grid     The level of medical decision making during this visit was of moderate complexity.      No orders of the defined types were placed in this encounter.      Orders Placed This Encounter   Medications    metoprolol succinate ER (TOPROL XL) 25 MG 24 hr tablet     Sig: Take 1 tablet (25 mg) by mouth daily     Dispense:  90 tablet     Refill:  4       Medications Discontinued During This Encounter   Medication Reason    nebivolol (BYSTOLIC) 5 MG tablet          Encounter Diagnoses   Name Primary?    Coronary artery disease involving native coronary artery of native heart without angina pectoris     Permanent atrial fibrillation (H) Yes    Hypercholesterolemia     Essential hypertension, benign     Ascending aorta dilatation (H24)     Dilated aortic root (H24)     Chronic anticoagulation     Orthostasis        CURRENT MEDICATIONS:  Current Outpatient Medications   Medication Sig Dispense Refill    ezetimibe (ZETIA) 10 MG tablet Take 1 tablet (10 mg) by mouth daily 90 tablet 3    metoprolol succinate ER (TOPROL XL) 25 MG 24 hr tablet Take 1 tablet (25 mg) by mouth daily 90 tablet 4    niacinamide 500 MG tablet Take 500 mg by mouth daily      nitroGLYcerin (NITROSTAT) 0.4 MG sublingual tablet Place 1 tablet (0.4  mg) under the tongue every 5 minutes as needed for chest pain If no relief after 3 doses, call 911 25 tablet 1    Omega-3 Fatty Acids (FISH OIL) 500 MG CAPS Take 1,500 mg by mouth daily      rivaroxaban ANTICOAGULANT (XARELTO ANTICOAGULANT) 20 MG TABS tablet Take 1 tablet (20 mg) by mouth daily (with dinner) 90 tablet 3    rosuvastatin (CRESTOR) 40 MG tablet Take 1 tablet (40 mg) by mouth daily 90 tablet 3    tadalafil (CIALIS) 20 MG tablet Take 1 tablet (20 mg) by mouth as needed (as desired) (Patient not taking: Reported on 5/24/2022) 14 tablet 3       ALLERGIES   No Known Allergies    PAST MEDICAL HISTORY:  Past Medical History:   Diagnosis Date    Benign essential hypertension     CAD (coronary artery disease)     cardiac cath 2010: ALEJANDRA to RCA; cardiac cath 2002: BMS to RCA, mid LAD, mid circumflex    Chronic atrial fibrillation (H)     Hypercholesterolemia     Old myocardial infarction     inferior MI 2010, inferior MI 2002       PAST SURGICAL HISTORY:  Past Surgical History:   Procedure Laterality Date    HEART CATH CORONARY ANGIOGRAM W/LV GRAM  1/2002    @ [facility] Bare Metal stents X 4,  distal RCA, mid :AD and mid Circumflex    HEART CATH CORONARY ANGIOGRAM W/LV GRAM  2/2010    2/10 Mid RCA thrombotic 90 % lesion stented       FAMILY HISTORY:  Family History   Problem Relation Age of Onset    Heart Disease Father         MI at 58       SOCIAL HISTORY:  Social History     Socioeconomic History    Marital status:      Spouse name: None    Number of children: None    Years of education: None    Highest education level: None   Tobacco Use    Smoking status: Former     Types: Cigars    Smokeless tobacco: Never    Tobacco comments:     3-4 per year   Substance and Sexual Activity    Alcohol use: Yes     Alcohol/week: 0.0 standard drinks of alcohol     Comment: 1-2 times per week    Drug use: No   Other Topics Concern    Caffeine Concern No     Comment: 2-3 cups per day    Sleep Concern No    Stress Concern  No    Special Diet Yes     Comment: low fat, high fiber    Back Care No    Exercise No    Parent/sibling w/ CABG, MI or angioplasty before 65F 55M? No     Social Determinants of Health     Financial Resource Strain: Low Risk  (10/24/2022)    Received from [facility], [facility]    Overall Financial Resource Strain (CARDIA)     Difficulty of Paying Living Expenses: Not hard at all   Food Insecurity: No Food Insecurity (10/24/2022)    Received from [facility], [facility]    Hunger Vital Sign     Worried About Running Out of Food in the Last Year: Never true     Ran Out of Food in the Last Year: Never true   Transportation Needs: No Transportation Needs (10/24/2022)    Received from [facility], [facility]    PRAPARE - Transportation     Lack of Transportation (Medical): No     Lack of Transportation (Non-Medical): No   Physical Activity: Sufficiently Active (10/24/2022)    Received from [facility], [facility]    Exercise Vital Sign     Days of Exercise per Week: 5 days     Minutes of Exercise per Session: 30 min   Stress: No Stress Concern Present (5/3/2024)    Received from [facility]    [employer] [place] [employer] - Occupational Stress Questionnaire     Feeling of Stress : Not at all   Social Connections: Unknown (5/3/2024)    Received from [facility]    Social Connection and Isolation Panel [NHANES]     Marital Status:    Interpersonal Safety: Not At Risk (10/24/2022)    Received from [facility], [facility]    Humiliation, Afraid, Rape, and Kick questionnaire     Fear of  "Current or Ex-Partner: No     Emotionally Abused: No     Physically Abused: No     Sexually Abused: No       Review of Systems:  Skin:  Negative   recent squamous cell removal   Eyes:  Negative      ENT:  Negative hearing loss  (Cold sx)  Respiratory:  Negative       Cardiovascular:  Negative;palpitations;chest pain;syncope or near-syncope;cyanosis;fatigue Positive for;lightheadedness;dizziness dizziness when going from supine to standing. gets dizzy during the back stroke when swimming.  Gastroenterology: Negative      Genitourinary:  Negative      Musculoskeletal:  Negative back pain;neck pain    Neurologic:  Negative      Psychiatric:  Negative      Heme/Lymph/Imm:  Negative allergies    Endocrine:  Negative        Physical Exam:  Vitals: /84 (BP Location: Left arm)   Pulse 72   Ht 1.854 m (6' 1\")   Wt 98.5 kg (217 lb 3.2 oz)   SpO2 97%   BMI 28.66 kg/m      Constitutional:  cooperative, alert and oriented, well developed, well nourished, in no acute distress        Skin:  warm and dry to the touch, no apparent skin lesions or masses noted          Head:  normocephalic, no masses or lesions        Eyes:  pupils equal and round, conjunctivae and lids unremarkable, sclera white, no xanthalasma, EOMS intact, no nystagmus        Lymph:      ENT:  no pallor or cyanosis, dentition good        Neck:  carotid pulses are full and equal bilaterally;no carotid bruit        Respiratory:  normal breath sounds, clear to auscultation, normal A-P diameter, normal symmetry, normal respiratory excursion, no use of accessory muscles         Cardiac: no murmurs, gallops or rubs detected;normal S1 and S2 irregularly irregular rhythm           Rate controlled  pulses full and equal                                        GI:           Extremities and Muscular Skeletal:  no edema;no spinal abnormalities noted;normal muscle strength and tone              Neurological:  no gross motor deficits        Psych:  affect appropriate, " oriented to time, person and place        CC  Christopher Martin MD  [address]  [place] [address]      Thank you for allowing me to participate in the care of your patient.      Sincerely,     Christopher Martin MD     [facility]

## 2024-06-04 NOTE — PROGRESS NOTES
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    He has coronary disease, permanent atrial fibrillation on rivaroxaban, hypercholesterolemia, erectile dysfunction and orthostatic hypotension.     Adis's coronary history dates back to 2002 when he had an inferior wall myocardial infarction with 4 bare metal stents placed in all 3 arteries at [facility].  In 2010, a second inferior wall myocardial infarction led to a drug-eluting stent being placed in his distal right coronary artery. Infarct was tiny.  Adis eventually quit smoking cigarettes which was a major prakash.  He is following a much healthier lifestyle, eating a better diet, watching his weight  and trying to exercise regularly.       He picked up his atrial fibrillation on his Apple Watch in the winter 2021.  He had no chest, arm, neck, jaw or shoulder discomfort.  No dyspnea on exertion, orthopnea or PND.  No lightheadedness, dizziness, syncope or near syncope.  No symptoms to suggest a TIA or CVA.  No spontaneous bleeding problems on his Xarelto      2021, Adis sold his place in [place] and moved up to his cabin in [facility] and bought a puppy and was leading a much healthier lifestyle.  He works remotely and comes down to the [facility] about twice a week.     He does snore at nighttime.  He states he takes 1 nap in the afternoon.  He is not interested in a sleep study.     His main complaint at this time is persistent orthostasis despite stopping losartan and cutting his Bystolic in half.     ASSESSMENT AND PLAN  Adis appears to be doing well from a cardiac standpoint without clinical evidence of ischemia or heart failure.  2023 echocardiogram showed an ejection fraction of 55 to 60%.  No significant valvular pathology.  Rate controlled atrial fibrillation.  Mildly dilated aortic root at 3.8 and ascending aorta at 4.1.     He is in atrial fibrillation by  physical exam. We did a Zio Patch in February 2022 which showed an average heart rate of 89, ranging from .  He had one 6-beat run of V-tach at 154 beats per minute.       Blood pressure is very well controlled at 118/84 with a pulse of 72.  He states his orthostasis is a daily problem.  He is only on Bystolic 5.  I have recommended we try changing metoprolol which should help with rate control but less of a blood pressure affect.  If he tolerates this then I will place a 3-day Zio patch to see what her average heart rate is.  If he does not tolerate it I will just check a Zio patch anyways to see how good rate control is.     He occasionally drinks alcohol.    In talking to him about his exercise regiment and diet he is not doing any resistance activity and I told him he should add this in twice a week.  Otherwise have congratulated him on his healthy lifestyle.     He notes no side effects or problems with his current medical regimen.     Fasting lipid profile through [facility] shows a little bit of a backslide with LDL of 60.  With the trend that steadily upward over the last 3 to 4 years.  We talked about the effect of aging and the fact that he may need to get tighter with his diet and has stated adding resistance activity.     Creatinine is 0.88 with normal electrolytes.  Glucose is 116 unclear if this was fasting or not.     He is playing pickleball, and as he is having no symptoms, I do not think I need to run a stress test at this time.     As stated he will call us after he has been on the metoprolol for a couple weeks to see how he is doing.  After that I will set up a Zio patch.  I will follow-up on that as appropriate..  I will plan on visiting in 1 year.  If he should have any problems, I would be glad to see him sooner.     Thank you for allowing me to participate in his care.     Christopher Martin MD, [facility]    The longitudinal plan of care for the diagnosis(es)/condition(s) as documented were  addressed during this visit. Due to the added complexity in care, I will continue to support Adis in the subsequent management and with ongoing continuity of care.      Today's clinic visit entailed:  Review of the result(s) of each unique test - echocardiogram, lab work  Ordering of each unique test  Prescription drug management  35 minutes spent by me on the date of the encounter doing chart review, history and exam, documentation and further activities per the note  Provider  Link to MDM Help Grid     The level of medical decision making during this visit was of moderate complexity.      No orders of the defined types were placed in this encounter.      Orders Placed This Encounter   Medications    metoprolol succinate ER (TOPROL XL) 25 MG 24 hr tablet     Sig: Take 1 tablet (25 mg) by mouth daily     Dispense:  90 tablet     Refill:  4       Medications Discontinued During This Encounter   Medication Reason    nebivolol (BYSTOLIC) 5 MG tablet          Encounter Diagnoses   Name Primary?    Coronary artery disease involving native coronary artery of native heart without angina pectoris     Permanent atrial fibrillation (H) Yes    Hypercholesterolemia     Essential hypertension, benign     Ascending aorta dilatation (H24)     Dilated aortic root (H24)     Chronic anticoagulation     Orthostasis        CURRENT MEDICATIONS:  Current Outpatient Medications   Medication Sig Dispense Refill    ezetimibe (ZETIA) 10 MG tablet Take 1 tablet (10 mg) by mouth daily 90 tablet 3    metoprolol succinate ER (TOPROL XL) 25 MG 24 hr tablet Take 1 tablet (25 mg) by mouth daily 90 tablet 4    niacinamide 500 MG tablet Take 500 mg by mouth daily      nitroGLYcerin (NITROSTAT) 0.4 MG sublingual tablet Place 1 tablet (0.4 mg) under the tongue every 5 minutes as needed for chest pain If no relief after 3 doses, call 911 25 tablet 1    Omega-3 Fatty Acids (FISH OIL) 500 MG CAPS Take 1,500 mg by mouth daily      rivaroxaban  ANTICOAGULANT (XARELTO ANTICOAGULANT) 20 MG TABS tablet Take 1 tablet (20 mg) by mouth daily (with dinner) 90 tablet 3    rosuvastatin (CRESTOR) 40 MG tablet Take 1 tablet (40 mg) by mouth daily 90 tablet 3    tadalafil (CIALIS) 20 MG tablet Take 1 tablet (20 mg) by mouth as needed (as desired) (Patient not taking: Reported on 5/24/2022) 14 tablet 3       ALLERGIES   No Known Allergies    PAST MEDICAL HISTORY:  Past Medical History:   Diagnosis Date    Benign essential hypertension     CAD (coronary artery disease)     cardiac cath 2010: ALEJANDRA to RCA; cardiac cath 2002: BMS to RCA, mid LAD, mid circumflex    Chronic atrial fibrillation (H)     Hypercholesterolemia     Old myocardial infarction     inferior MI 2010, inferior MI 2002       PAST SURGICAL HISTORY:  Past Surgical History:   Procedure Laterality Date    HEART CATH CORONARY ANGIOGRAM W/LV GRAM  1/2002    @ [facility] Bare Metal stents X 4,  distal RCA, mid :AD and mid Circumflex    HEART CATH CORONARY ANGIOGRAM W/LV GRAM  2/2010    2/10 Mid RCA thrombotic 90 % lesion stented       FAMILY HISTORY:  Family History   Problem Relation Age of Onset    Heart Disease Father         MI at 58       SOCIAL HISTORY:  Social History     Socioeconomic History    Marital status:      Spouse name: None    Number of children: None    Years of education: None    Highest education level: None   Tobacco Use    Smoking status: Former     Types: Cigars    Smokeless tobacco: Never    Tobacco comments:     3-4 per year   Substance and Sexual Activity    Alcohol use: Yes     Alcohol/week: 0.0 standard drinks of alcohol     Comment: 1-2 times per week    Drug use: No   Other Topics Concern    Caffeine Concern No     Comment: 2-3 cups per day    Sleep Concern No    Stress Concern No    Special Diet Yes     Comment: low fat, high fiber    Back Care No    Exercise No    Parent/sibling w/ CABG, MI or angioplasty before 65F 55M? No     Social Determinants of Health     Financial  Resource Strain: Low Risk  (10/24/2022)    [facility] from [facility], [facility]    Overall Financial Resource Strain (CARDIA)     Difficulty of Paying Living Expenses: Not hard at all   Food Insecurity: No Food Insecurity (10/24/2022)    Received from [facility], [facility]    Hunger Vital Sign     Worried About Running Out of Food in the Last Year: Never true     Ran Out of Food in the Last Year: Never true   Transportation Needs: No Transportation Needs (10/24/2022)    Received from [facility], [facility]    PRAPARE - Transportation     Lack of Transportation (Medical): No     Lack of Transportation (Non-Medical): No   Physical Activity: Sufficiently Active (10/24/2022)    Received from [facility], [facility]    Exercise Vital Sign     Days of Exercise per Week: 5 days     Minutes of Exercise per Session: 30 min   Stress: No Stress Concern Present (5/3/2024)    Received from [facility]    [employer] - Occupational Stress Questionnaire     Feeling of Stress : Not at all   Social Connections: Unknown (5/3/2024)    Received from [facility]    Social Connection and Isolation Panel [NHANES]     Marital Status:    Interpersonal Safety: Not At Risk (10/24/2022)    Received from [facility], [facility]    Humiliation, Afraid, Rape, and Kick questionnaire     Fear of Current or Ex-Partner: No     Emotionally Abused: No     Physically Abused: No     Sexually Abused: No       Review of Systems:  Skin:  Negative   recent squamous cell removal   Eyes:  Negative     "  ENT:  Negative hearing loss  (Cold sx)  Respiratory:  Negative       Cardiovascular:  Negative;palpitations;chest pain;syncope or near-syncope;cyanosis;fatigue Positive for;lightheadedness;dizziness dizziness when going from supine to standing. gets dizzy during the back stroke when swimming.  Gastroenterology: Negative      Genitourinary:  Negative      Musculoskeletal:  Negative back pain;neck pain    Neurologic:  Negative      Psychiatric:  Negative      Heme/Lymph/Imm:  Negative allergies    Endocrine:  Negative        Physical Exam:  Vitals: /84 (BP Location: Left arm)   Pulse 72   Ht 1.854 m (6' 1\")   Wt 98.5 kg (217 lb 3.2 oz)   SpO2 97%   BMI 28.66 kg/m      Constitutional:  cooperative, alert and oriented, well developed, well nourished, in no acute distress        Skin:  warm and dry to the touch, no apparent skin lesions or masses noted          Head:  normocephalic, no masses or lesions        Eyes:  pupils equal and round, conjunctivae and lids unremarkable, sclera white, no xanthalasma, EOMS intact, no nystagmus        Lymph:      ENT:  no pallor or cyanosis, dentition good        Neck:  carotid pulses are full and equal bilaterally;no carotid bruit        Respiratory:  normal breath sounds, clear to auscultation, normal A-P diameter, normal symmetry, normal respiratory excursion, no use of accessory muscles         Cardiac: no murmurs, gallops or rubs detected;normal S1 and S2 irregularly irregular rhythm           Rate controlled  pulses full and equal                                        GI:           Extremities and Muscular Skeletal:  no edema;no spinal abnormalities noted;normal muscle strength and tone              Neurological:  no gross motor deficits        Psych:  affect appropriate, oriented to time, person and place        CC  Christopher Martin MD  [address]  [place] [id number]                "

## 2024-08-05 DIAGNOSIS — E78.00 HYPERCHOLESTEROLEMIA: ICD-10-CM

## 2024-08-05 DIAGNOSIS — I25.10 CORONARY ARTERY DISEASE INVOLVING NATIVE CORONARY ARTERY OF NATIVE HEART WITHOUT ANGINA PECTORIS: ICD-10-CM

## 2024-08-05 RX ORDER — EZETIMIBE 10 MG/1
10 TABLET ORAL DAILY
Qty: 90 TABLET | Refills: 3 | Status: SHIPPED | OUTPATIENT
Start: 2024-08-05

## 2024-08-05 RX ORDER — ROSUVASTATIN CALCIUM 40 MG/1
40 TABLET, COATED ORAL DAILY
Qty: 90 TABLET | Refills: 3 | Status: SHIPPED | OUTPATIENT
Start: 2024-08-05

## 2024-11-13 DIAGNOSIS — I48.20 CHRONIC ATRIAL FIBRILLATION (H): ICD-10-CM

## 2024-11-13 DIAGNOSIS — I25.10 CORONARY ARTERY DISEASE INVOLVING NATIVE CORONARY ARTERY OF NATIVE HEART WITHOUT ANGINA PECTORIS: ICD-10-CM

## 2024-11-13 RX ORDER — NITROGLYCERIN 0.4 MG/1
0.4 TABLET SUBLINGUAL EVERY 5 MIN PRN
Qty: 25 TABLET | Refills: 2 | Status: SHIPPED | OUTPATIENT
Start: 2024-11-13

## 2024-11-14 ENCOUNTER — TELEPHONE (OUTPATIENT)
Dept: CARDIOLOGY | Facility: CLINIC | Age: 69
End: 2024-11-14
Payer: COMMERCIAL

## 2024-11-14 NOTE — TELEPHONE ENCOUNTER
Dr. Martin received the below fax. [facility] called directly and writer spoke with Pharmacy Mae Bahena. Shruti reports with they have received this medication and are now able to fill it as prescribed, She is closing the ticket and nothing further is needed from Dr. Martin at thi time.

## 2024-12-31 ENCOUNTER — NURSE TRIAGE (OUTPATIENT)
Dept: CARDIOLOGY | Facility: CLINIC | Age: 69
End: 2024-12-31
Payer: COMMERCIAL

## 2024-12-31 NOTE — TELEPHONE ENCOUNTER
"Called patient to follow up after eye visit, said he doesn't recall what the diagnosis was, but he states its \"strictly an eye issue\" and has follow up again in 5wks. Ophthalmologist was not concerned for a stroke per patient. He has been compliant with his xarelto. ED precautions reviewed with patient/wife. Pt to call back as needed.   "

## 2024-12-31 NOTE — TELEPHONE ENCOUNTER
"Received a call from the [facility] to discuss an eye concern.    HX: Permanent atrial fibrillation (on xarelto), CAD, Hypercholesterolemia, HTN, Ascending aorta dilatation, Dilated aortic root, Chronic anticoagulation, Orthostasis.    Spoke to Adis, who says he has had a \"floater\" in his left eye only, that started last evening after he woke up from a nap. He says he had lasix surgery about 25 years ago, but denies wearing glasses or contacts. He says he already has an appointment this morning at 1130 with an eye doctor in [place]. He wonders if there are any other recommendations. Advised to keep eye appointment this morning to determine the cause of his vision change. He verbalized agreement and understanding.  Will send a message to Dr. Martin's team for an update.    1. DESCRIPTION: \"How has your vision changed?\" (e.g., complete vision loss, blurred vision, double vision, floaters, etc.) floater  2. LOCATION: \"One or both eyes?\" If one, ask: \"Which eye?\" Left eye  3. SEVERITY: \"Can you see anything?\" If Yes, ask: \"What can you see?\" (e.g., fine print)  4. ONSET: \"When did this begin?\" \"Did it start suddenly or has this been gradual?\"Last evening after waking up from a nap  5. PATTERN: \"Does this come and go, or has it been constant since it started?\"  6. PAIN: \"Is there any pain in your eye(s)?\" (Scale 1-10; or mild, moderate, severe)  - NONE (0): No pain.  - MILD (1-3): Doesn't interfere with normal activities. Mild  - MODERATE (4-7): Interferes with normal activities or awakens from sleep.  - SEVERE (8-10): Excruciating pain, unable to do any normal activities.  7. CONTACTS-GLASSES: \"Do you wear contacts or glasses?\" no  8. CAUSE: \"What do you think is causing this visual problem?\"  9. OTHER SYMPTOMS: \"Do you have any other symptoms?\" (e.g., confusion, headache, arm or leg weakness, speech problems)  10. PREGNANCY: \"Is there any chance you are pregnant?\" \"When was your last menstrual period?\"  "

## 2025-03-05 ENCOUNTER — OFFICE VISIT (OUTPATIENT)
Dept: CARDIOLOGY | Facility: CLINIC | Age: 70
End: 2025-03-05
Payer: COMMERCIAL

## 2025-03-05 VITALS
SYSTOLIC BLOOD PRESSURE: 160 MMHG | DIASTOLIC BLOOD PRESSURE: 104 MMHG | HEIGHT: 73 IN | HEART RATE: 105 BPM | WEIGHT: 226 LBS | OXYGEN SATURATION: 95 % | BODY MASS INDEX: 29.95 KG/M2

## 2025-03-05 DIAGNOSIS — E78.00 HYPERCHOLESTEROLEMIA: ICD-10-CM

## 2025-03-05 DIAGNOSIS — Z79.01 CHRONIC ANTICOAGULATION: ICD-10-CM

## 2025-03-05 DIAGNOSIS — I25.10 CORONARY ARTERY DISEASE INVOLVING NATIVE CORONARY ARTERY OF NATIVE HEART WITHOUT ANGINA PECTORIS: Primary | Chronic | ICD-10-CM

## 2025-03-05 DIAGNOSIS — I48.21 PERMANENT ATRIAL FIBRILLATION (H): ICD-10-CM

## 2025-03-05 DIAGNOSIS — I10 ESSENTIAL HYPERTENSION, BENIGN: ICD-10-CM

## 2025-03-05 DIAGNOSIS — I77.810 ASCENDING AORTA DILATATION: ICD-10-CM

## 2025-03-05 DIAGNOSIS — I77.810 DILATED AORTIC ROOT: ICD-10-CM

## 2025-03-05 NOTE — PROGRESS NOTES
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    He has coronary disease, permanent atrial fibrillation on rivaroxaban, hypercholesterolemia, erectile dysfunction and labile hypertension.     Adis's coronary history dates back to 2002 when he had an inferior wall myocardial infarction with 4 bare metal stents placed in all 3 arteries at [facility].  In 2010, a second inferior wall myocardial infarction led to a drug-eluting stent being placed in his distal right coronary artery. Infarct was tiny.  Adis eventually quit smoking cigarettes which was a major prakash.  He is following a much healthier lifestyle, eating a better diet, watching his weight  and trying to exercise regularly.       He picked up his atrial fibrillation on his Apple Watch in the winter 2021.  He had no chest, arm, neck, jaw or shoulder discomfort.  No dyspnea on exertion, orthopnea or PND.  No lightheadedness, dizziness, syncope or near syncope.  No symptoms to suggest a TIA or CVA.  No spontaneous bleeding problems on his Xarelto      2021, Adis sold his place in [place] and moved up to his cabin in [facility] [place] and bought a puppy and was leading a much healthier lifestyle.  He works remotely and comes down to the [facility] about twice a week.     He does snore at nighttime.  He states he takes 1 nap in the afternoon.  He is not interested in a sleep study.     His main complaint historically has been orthostasis despite stopping losartan, cutting his Bystolic in half and last year changing Bystolic to metoprolol which resulted in resolution of his orthostasis.    Adis comes into clinic today and states has been quite stressful.  An  fell through with [employer].  His immediate boss was fired.  Yesterday they had a big going away celebration for his boss with dietary indiscretion.  He just got off a stressful talk with his new boss about the  [employer] negotiation with his company.  As result blood pressure is quite high today.  Also of note his A-fib is more rapid today.    Otherwise Adis states he has been doing fine.  Adis has no chest, arm, neck, jaw or shoulder discomfort.  No dyspnea on exertion, orthopnea, or PND.  No palpitations, lightheadedness, dizziness, syncope, or near syncope.  No peripheral edema.  For the most part he is unaware of his A-fib.  He has no spontaneous bleeding problems with his Xarelto.  He has not been checking his blood pressure.  I had recommended a Zio patch after he made the change to metoprolol to make sure we maintained good control of his A-fib.  Adis did not follow-up on this.  I do see in April EKG in the chart that shows A-fib with a rate of 85 bpm.       ASSESSMENT AND PLAN  Adis appears to be doing well from a cardiac standpoint without clinical evidence of ischemia or heart failure.  2023 echocardiogram showed an ejection fraction of 55 to 60%.  No significant valvular pathology.  Rate controlled atrial fibrillation.  Mildly dilated aortic root at 3.8 and ascending aorta at 4.1.     He is in atrial fibrillation by physical exam. We did a Zio Patch in February 2022 which showed an average heart rate of 89, ranging from .  He had one 6-beat run of V-tach at 154 beats per minute.  This was on Bystolic     Blood pressure when I saw him last year was 118/84.  Today is 160/104.  As he is clearly under some immediate stress with some dietary indiscretion yesterday.  He states he did take his metoprolol.  My recommendation is just to check his blood pressure at home and call us with these results.  We will intensify his rate control and/or blood pressure control as indicated.     He occasionally drinks alcohol.     In talking to him about his exercise regiment and diet he is not doing any resistance activity and I told him he should add this in twice a week.  Otherwise have congratulated him on his healthy  lifestyle.     He notes no side effects or problems with his current medical regimen.     Fasting lipid profile through [facility] shows total cholesterol 123, HDL 41, LDL 60 and triglycerides of 109.  Acceptable on rosuvastatin 40 and Zetia 10.     A CMP also through [facility] is normal except for glucose of 116.  His last TSH was in April 2024 and was normal.     He asked me about Negro Young he cardiologist up in [place] as a potential new cardiologist for him as I am retiring.  I know Negro well from when he was at the [facility] and he moonlit for our group.  I think Negro is an excellent choice going forward.     I will follow-up on Adis's blood pressure otherwise he will transition his care to Negro.  We would be glad to see him if necessary.  Thank you for allowing me to participate in his care.     Christopher Martin MD, [facility]    The longitudinal plan of care for the diagnosis(es)/condition(s) as documented were addressed during this visit. Due to the added complexity in care, I will continue to support Adis in the subsequent management and with ongoing continuity of care.         Today's clinic visit entailed:  Review of external notes as documented elsewhere in note  Review of the result(s) of each unique test - lab work  Ordering of each unique test  Prescription drug management  30 minutes spent by me on the date of the encounter doing chart review, history and exam, documentation and further activities per the note  Provider  Link to [facility] Help Grid     The level of medical decision making during this visit was of moderate complexity.      No orders of the defined types were placed in this encounter.      Orders Placed This Encounter   Medications    rivaroxaban ANTICOAGULANT (XARELTO ANTICOAGULANT) 20 MG TABS tablet     Sig: Take 1 tablet (20 mg) by mouth daily (with dinner).     Dispense:  90 tablet     Refill:  3       Medications Discontinued During This Encounter   Medication Reason    rivaroxaban  ANTICOAGULANT (XARELTO ANTICOAGULANT) 20 MG TABS tablet Reorder (No AVS)         Encounter Diagnoses   Name Primary?    Coronary artery disease involving native coronary artery of native heart without angina pectoris Yes    Hypercholesterolemia     Essential hypertension, benign     Permanent atrial fibrillation (H)     Ascending aorta dilatation     Dilated aortic root     Chronic anticoagulation        CURRENT MEDICATIONS:  Current Outpatient Medications   Medication Sig Dispense Refill    ezetimibe (ZETIA) 10 MG tablet Take 1 tablet (10 mg) by mouth daily 90 tablet 3    metoprolol succinate ER (TOPROL XL) 25 MG 24 hr tablet Take 1 tablet (25 mg) by mouth daily 90 tablet 4    niacinamide 500 MG tablet Take 500 mg by mouth daily      nitroGLYcerin (NITROSTAT) 0.4 MG sublingual tablet Place 1 tablet (0.4 mg) under the tongue every 5 minutes as needed for chest pain. If no relief after 3 doses, call 911 25 tablet 2    Omega-3 Fatty Acids (FISH OIL) 500 MG CAPS Take 1,500 mg by mouth daily      rivaroxaban ANTICOAGULANT (XARELTO ANTICOAGULANT) 20 MG TABS tablet Take 1 tablet (20 mg) by mouth daily (with dinner). 90 tablet 3    rosuvastatin (CRESTOR) 40 MG tablet Take 1 tablet (40 mg) by mouth daily 90 tablet 3    tadalafil (CIALIS) 20 MG tablet Take 1 tablet (20 mg) by mouth as needed (as desired) (Patient not taking: Reported on 5/24/2022) 14 tablet 3       ALLERGIES   No Known Allergies    PAST MEDICAL HISTORY:  Past Medical History:   Diagnosis Date    Benign essential hypertension     CAD (coronary artery disease)     cardiac cath 2010: ALEJANDRA to RCA; cardiac cath 2002: BMS to RCA, mid LAD, mid circumflex    Chronic atrial fibrillation (H)     Hypercholesterolemia     Old myocardial infarction     inferior MI 2010, inferior MI 2002       PAST SURGICAL HISTORY:  Past Surgical History:   Procedure Laterality Date    HEART CATH CORONARY ANGIOGRAM W/LV GRAM  1/2002    @ [facility] Bare Metal stents X 4,  distal RCA, mid :AD  and mid Circumflex    HEART CATH CORONARY ANGIOGRAM W/LV GRAM  2/2010    2/10 Mid RCA thrombotic 90 % lesion stented       FAMILY HISTORY:  Family History   Problem Relation Age of Onset    Heart Disease Father         MI at 58       SOCIAL HISTORY:  Social History     Socioeconomic History    Marital status:      Spouse name: None    Number of children: None    Years of education: None    Highest education level: None   Tobacco Use    Smoking status: Former     Types: Cigars    Smokeless tobacco: Never    Tobacco comments:     3-4 per year   Substance and Sexual Activity    Alcohol use: Yes     Alcohol/week: 0.0 standard drinks of alcohol     Comment: 1-2 times per week    Drug use: No   Other Topics Concern    Caffeine Concern No     Comment: 2-3 cups per day    Sleep Concern No    Stress Concern No    Special Diet Yes     Comment: low fat, high fiber    Back Care No    Exercise No    Parent/sibling w/ CABG, MI or angioplasty before 65F 55M? No     Social Drivers of Health     Financial Resource Strain: Low Risk  (10/24/2022)    [facility]    Overall Financial Resource Strain (CARDIA)     Difficulty of Paying Living Expenses: Not hard at all   Food Insecurity: No Food Insecurity (10/24/2022)    Received from [facility], [facility]    Hunger Vital Sign     Worried About Running Out of Food in the Last Year: Never true     Ran Out of Food in the Last Year: Never true   Transportation Needs: No Transportation Needs (10/24/2022)    Received from [facility], [facility]    PRAPARE - Transportation     Lack of Transportation (Medical): No     Lack of Transportation (Non-Medical): No   Physical Activity: Sufficiently Active (10/24/2022)    Received from [facility]  "[facility], [facility]    Exercise Vital Sign     Days of Exercise per Week: 5 days     Minutes of Exercise per Session: 30 min   Stress: No Stress Concern Present (5/3/2024)    Received from [facility]    [employer] - Occupational Stress Questionnaire     Feeling of Stress : Not at all   Social Connections: Unknown (5/3/2024)    Received from [facility]    Social Connection and Isolation Panel [NHANES]     Marital Status:    Interpersonal Safety: Not At Risk (10/24/2022)    Received from [facility], [facility]    Humiliation, Afraid, Rape, and Kick questionnaire     Fear of Current or Ex-Partner: No     Emotionally Abused: No     Physically Abused: No     Sexually Abused: No       Review of Systems:  Skin:  Negative       Eyes:  Negative      ENT:  Negative      Respiratory:  Negative       Cardiovascular:  Negative      Gastroenterology: Negative      Genitourinary:  Negative      Musculoskeletal:  Negative      Neurologic:  Negative      Psychiatric:  Negative      Heme/Lymph/Imm:  Negative      Endocrine:  Negative        Physical Exam:  Vitals: BP (!) 160/104   Pulse 105   Ht 1.854 m (6' 1\")   Wt 102.5 kg (226 lb)   SpO2 95%   BMI 29.82 kg/m      Constitutional:  cooperative, alert and oriented, well developed, well nourished, in no acute distress        Skin:  warm and dry to the touch, no apparent skin lesions or masses noted          Head:  normocephalic, no masses or lesions        Eyes:  pupils equal and round, conjunctivae and lids unremarkable, sclera white, no xanthalasma, EOMS intact, no nystagmus        Lymph:      ENT:  no pallor or cyanosis, dentition good        Neck:  carotid pulses are full and equal bilaterally, no carotid bruit        Respiratory:  normal " breath sounds, clear to auscultation, normal A-P diameter, normal symmetry, normal respiratory excursion, no use of accessory muscles         Cardiac: no murmurs, gallops or rubs detected, normal S1 and S2 irregularly irregular rhythm, tachycardic           Rate controlled  pulses full and equal                                        GI:           Extremities and Muscular Skeletal:  no edema, no spinal abnormalities noted, normal muscle strength and tone              Neurological:  no gross motor deficits        Psych:  affect appropriate, oriented to time, person and place        CC  Christopher Martin MD  [id number]  [place] [id number]

## 2025-03-05 NOTE — LETTER
3/5/2025    Mary Donaldson MD  [address]  [facility] [address]    RE: Adis Prince       Dear Colleague,     I had the pleasure of seeing Adis Prince in the [facility].  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    He has coronary disease, permanent atrial fibrillation on rivaroxaban, hypercholesterolemia, erectile dysfunction and labile hypertension.     Adis's coronary history dates back to 2002 when he had an inferior wall myocardial infarction with 4 bare metal stents placed in all 3 arteries at [facility].  In 2010, a second inferior wall myocardial infarction led to a drug-eluting stent being placed in his distal right coronary artery. Infarct was tiny.  Adis eventually quit smoking cigarettes which was a major prakash.  He is following a much healthier lifestyle, eating a better diet, watching his weight  and trying to exercise regularly.       He picked up his atrial fibrillation on his Apple Watch in the winter 2021.  He had no chest, arm, neck, jaw or shoulder discomfort.  No dyspnea on exertion, orthopnea or PND.  No lightheadedness, dizziness, syncope or near syncope.  No symptoms to suggest a TIA or CVA.  No spontaneous bleeding problems on his Xarelto      2021, Adis sold his place in [place] and moved up to his cabin in [place] and bought a puppy and was leading a much healthier lifestyle.  He works remotely and comes down to the [facility] about twice a week.     He does snore at nighttime.  He states he takes 1 nap in the afternoon.  He is not interested in a sleep study.     His main complaint historically has been orthostasis despite stopping losartan, cutting his Bystolic in half and last year changing Bystolic to metoprolol which resulted in resolution of his orthostasis.    Adis comes into clinic today and states has been quite stressful.  An   fell through with [employer].  His immediate boss was fired.  Yesterday they had a big going away celebration for his boss with dietary indiscretion.  He just got off a stressful talk with his new boss about the [employer] negotiation with his company.  As result blood pressure is quite high today.  Also of note his A-fib is more rapid today.    Otherwise Adis states he has been doing fine.  Adis has no chest, arm, neck, jaw or shoulder discomfort.  No dyspnea on exertion, orthopnea, or PND.  No palpitations, lightheadedness, dizziness, syncope, or near syncope.  No peripheral edema.  For the most part he is unaware of his A-fib.  He has no spontaneous bleeding problems with his Xarelto.  He has not been checking his blood pressure.  I had recommended a Zio patch after he made the change to metoprolol to make sure we maintained good control of his A-fib.  Adis did not follow-up on this.  I do see in April EKG in the chart that shows A-fib with a rate of 85 bpm.       ASSESSMENT AND PLAN  Adis appears to be doing well from a cardiac standpoint without clinical evidence of ischemia or heart failure.  2023 echocardiogram showed an ejection fraction of 55 to 60%.  No significant valvular pathology.  Rate controlled atrial fibrillation.  Mildly dilated aortic root at 3.8 and ascending aorta at 4.1.     He is in atrial fibrillation by physical exam. We did a Zio Patch in February 2022 which showed an average heart rate of 89, ranging from .  He had one 6-beat run of V-tach at 154 beats per minute.  This was on Bystolic     Blood pressure when I saw him last year was 118/84.  Today is 160/104.  As he is clearly under some immediate stress with some dietary indiscretion yesterday.  He states he did take his metoprolol.  My recommendation is just to check his blood pressure at home and call us with these results.  We will intensify his rate control and/or blood pressure control as indicated.     He  occasionally drinks alcohol.     In talking to him about his exercise regiment and diet he is not doing any resistance activity and I told him he should add this in twice a week.  Otherwise have congratulated him on his healthy lifestyle.     He notes no side effects or problems with his current medical regimen.     Fasting lipid profile through [facility] shows total cholesterol 123, HDL 41, LDL 60 and triglycerides of 109.  Acceptable on rosuvastatin 40 and Zetia 10.     A CMP also through [facility] is normal except for glucose of 116.  His last TSH was in April 2024 and was normal.     He asked me about Negro Young he cardiologist up in [place] as a potential new cardiologist for him as I am retiring.  I know Negro well from when he was at the [facility] and he moonlit for our group.  I think Negro is an excellent choice going forward.     I will follow-up on Adis's blood pressure otherwise he will transition his care to Negro.  We would be glad to see him if necessary.  Thank you for allowing me to participate in his care.     Christopher Martin MD, [facility]    The longitudinal plan of care for the diagnosis(es)/condition(s) as documented were addressed during this visit. Due to the added complexity in care, I will continue to support Adis in the subsequent management and with ongoing continuity of care.         Today's clinic visit entailed:  Review of external notes as documented elsewhere in note  Review of the result(s) of each unique test - lab work  Ordering of each unique test  Prescription drug management  30 minutes spent by me on the date of the encounter doing chart review, history and exam, documentation and further activities per the note  Provider  Link to [facility] Help Grid     The level of medical decision making during this visit was of moderate complexity.      No orders of the defined types were placed in this encounter.      Orders Placed This Encounter   Medications     rivaroxaban ANTICOAGULANT  (XARELTO ANTICOAGULANT) 20 MG TABS tablet     Sig: Take 1 tablet (20 mg) by mouth daily (with dinner).     Dispense:  90 tablet     Refill:  3       Medications Discontinued During This Encounter   Medication Reason     rivaroxaban ANTICOAGULANT (XARELTO ANTICOAGULANT) 20 MG TABS tablet Reorder (No AVS)         Encounter Diagnoses   Name Primary?     Coronary artery disease involving native coronary artery of native heart without angina pectoris Yes     Hypercholesterolemia      Essential hypertension, benign      Permanent atrial fibrillation (H)      Ascending aorta dilatation      Dilated aortic root      Chronic anticoagulation        CURRENT MEDICATIONS:  Current Outpatient Medications   Medication Sig Dispense Refill     ezetimibe (ZETIA) 10 MG tablet Take 1 tablet (10 mg) by mouth daily 90 tablet 3     metoprolol succinate ER (TOPROL XL) 25 MG 24 hr tablet Take 1 tablet (25 mg) by mouth daily 90 tablet 4     niacinamide 500 MG tablet Take 500 mg by mouth daily       nitroGLYcerin (NITROSTAT) 0.4 MG sublingual tablet Place 1 tablet (0.4 mg) under the tongue every 5 minutes as needed for chest pain. If no relief after 3 doses, call 911 25 tablet 2     Omega-3 Fatty Acids (FISH OIL) 500 MG CAPS Take 1,500 mg by mouth daily       rivaroxaban ANTICOAGULANT (XARELTO ANTICOAGULANT) 20 MG TABS tablet Take 1 tablet (20 mg) by mouth daily (with dinner). 90 tablet 3     rosuvastatin (CRESTOR) 40 MG tablet Take 1 tablet (40 mg) by mouth daily 90 tablet 3     tadalafil (CIALIS) 20 MG tablet Take 1 tablet (20 mg) by mouth as needed (as desired) (Patient not taking: Reported on 5/24/2022) 14 tablet 3       ALLERGIES   No Known Allergies    PAST MEDICAL HISTORY:  Past Medical History:   Diagnosis Date     Benign essential hypertension      CAD (coronary artery disease)     cardiac cath 2010: ALEJANDRA to RCA; cardiac cath 2002: BMS to RCA, mid LAD, mid circumflex     Chronic atrial fibrillation (H)      Hypercholesterolemia       Old myocardial infarction     inferior MI 2010, inferior MI 2002       PAST SURGICAL HISTORY:  Past Surgical History:   Procedure Laterality Date     HEART CATH CORONARY ANGIOGRAM W/LV GRAM  1/2002    @ [facility] Bare Metal stents X 4,  distal RCA, mid :AD and mid Circumflex     HEART CATH CORONARY ANGIOGRAM W/LV GRAM  2/2010    2/10 Mid RCA thrombotic 90 % lesion stented       FAMILY HISTORY:  Family History   Problem Relation Age of Onset     Heart Disease Father         MI at 58       SOCIAL HISTORY:  Social History     Socioeconomic History     Marital status:      Spouse name: None     Number of children: None     Years of education: None     Highest education level: None   Tobacco Use     Smoking status: Former     Types: Cigars     Smokeless tobacco: Never     Tobacco comments:     3-4 per year   Substance and Sexual Activity     Alcohol use: Yes     Alcohol/week: 0.0 standard drinks of alcohol     Comment: 1-2 times per week     Drug use: No   Other Topics Concern     Caffeine Concern No     Comment: 2-3 cups per day     Sleep Concern No     Stress Concern No     Special Diet Yes     Comment: low fat, high fiber     Back Care No     Exercise No     Parent/sibling w/ CABG, MI or angioplasty before 65F 55M? No     Social Drivers of Health     Financial Resource Strain: Low Risk  (10/24/2022)    Received from [facility], [facility]    Overall Financial Resource Strain (CARDIA)      Difficulty of Paying Living Expenses: Not hard at all   Food Insecurity: No Food Insecurity (10/24/2022)    Received [facility]    Hunger Vital Sign      Worried About Running Out of Food in the Last Year: Never true      Ran Out of Food in the Last Year: Never true   Transportation Needs: No Transportation Needs (10/24/2022)    Received from [facility]  "[facility], [facility]    PRAPARE - Transportation      Lack of Transportation (Medical): No      Lack of Transportation (Non-Medical): No   Physical Activity: Sufficiently Active (10/24/2022)    [facility]    Exercise Vital Sign      Days of Exercise per Week: 5 days      Minutes of Exercise per Session: 30 min   Stress: No Stress Concern Present (5/3/2024)    Received from [facility]    [employer] [place] [employer] - Occupational Stress Questionnaire      Feeling of Stress : Not at all   Social Connections: Unknown (5/3/2024)    Received from [facility]    Social Connection and Isolation Panel [NHANES]      Marital Status:    Interpersonal Safety: Not At Risk (10/24/2022)    [facility]    Humiliation, Afraid, Rape, and Kick questionnaire      Fear of Current or Ex-Partner: No      Emotionally Abused: No      Physically Abused: No      Sexually Abused: No       Review of Systems:  Skin:  Negative       Eyes:  Negative      ENT:  Negative      Respiratory:  Negative       Cardiovascular:  Negative      Gastroenterology: Negative      Genitourinary:  Negative      Musculoskeletal:  Negative      Neurologic:  Negative      Psychiatric:  Negative      Heme/Lymph/Imm:  Negative      Endocrine:  Negative        Physical Exam:  Vitals: BP (!) 160/104   Pulse 105   Ht 1.854 m (6' 1\")   Wt 102.5 kg (226 lb)   SpO2 95%   BMI 29.82 kg/m      Constitutional:  cooperative, alert and oriented, well developed, well nourished, in no acute distress        Skin:  warm and dry to the touch, no apparent skin lesions or masses noted          Head:  normocephalic, no masses or lesions        Eyes:  pupils " equal and round, conjunctivae and lids unremarkable, sclera white, no xanthalasma, EOMS intact, no nystagmus        Lymph:      ENT:  no pallor or cyanosis, dentition good        Neck:  carotid pulses are full and equal bilaterally, no carotid bruit        Respiratory:  normal breath sounds, clear to auscultation, normal A-P diameter, normal symmetry, normal respiratory excursion, no use of accessory muscles         Cardiac: no murmurs, gallops or rubs detected, normal S1 and S2 irregularly irregular rhythm, tachycardic           Rate controlled  pulses full and equal                                        GI:           Extremities and Muscular Skeletal:  no edema, no spinal abnormalities noted, normal muscle strength and tone              Neurological:  no gross motor deficits        Psych:  affect appropriate, oriented to time, person and place        CC  Christopher Martin MD  [address]  [place] [id number]                  Thank you for allowing me to participate in the care of your patient.      Sincerely,     Christopher Martin MD     [facility]  cc:   Christopher Martin MD  [address]  [place] [id number]

## 2025-03-12 DIAGNOSIS — I48.21 PERMANENT ATRIAL FIBRILLATION (H): ICD-10-CM

## 2025-03-12 RX ORDER — METOPROLOL SUCCINATE 25 MG/1
25 TABLET, EXTENDED RELEASE ORAL DAILY
Qty: 90 TABLET | Refills: 3 | Status: SHIPPED | OUTPATIENT
Start: 2025-03-12

## 2025-03-20 ENCOUNTER — MYC MEDICAL ADVICE (OUTPATIENT)
Dept: CARDIOLOGY | Facility: CLINIC | Age: 70
End: 2025-03-20
Payer: COMMERCIAL

## 2025-03-20 DIAGNOSIS — I48.21 PERMANENT ATRIAL FIBRILLATION (H): ICD-10-CM

## 2025-03-20 RX ORDER — METOPROLOL SUCCINATE 25 MG/1
25 TABLET, EXTENDED RELEASE ORAL DAILY
Qty: 90 TABLET | Refills: 3 | Status: SHIPPED | OUTPATIENT
Start: 2025-03-20

## 2025-06-13 NOTE — TELEPHONE ENCOUNTER
[facility] Refill Guideline reviewed.  Medication meets criteria for refill.   
Awake/Alert

## 2025-06-21 ENCOUNTER — HEALTH MAINTENANCE LETTER (OUTPATIENT)
Age: 70
End: 2025-06-21